# Patient Record
Sex: MALE | Race: BLACK OR AFRICAN AMERICAN | NOT HISPANIC OR LATINO | URBAN - METROPOLITAN AREA
[De-identification: names, ages, dates, MRNs, and addresses within clinical notes are randomized per-mention and may not be internally consistent; named-entity substitution may affect disease eponyms.]

---

## 2018-07-28 ENCOUNTER — INPATIENT (INPATIENT)
Facility: HOSPITAL | Age: 78
LOS: 5 days | Discharge: NURSING, REHAB OR LONG-TERM CARE | End: 2018-08-03
Attending: INTERNAL MEDICINE | Admitting: INTERNAL MEDICINE
Payer: MEDICARE

## 2018-07-28 VITALS
HEIGHT: 64 IN | DIASTOLIC BLOOD PRESSURE: 94 MMHG | HEART RATE: 113 BPM | RESPIRATION RATE: 18 BRPM | SYSTOLIC BLOOD PRESSURE: 124 MMHG | OXYGEN SATURATION: 98 % | WEIGHT: 149.91 LBS | TEMPERATURE: 99 F

## 2018-07-28 DIAGNOSIS — F10.230 ALCOHOL DEPENDENCE WITH WITHDRAWAL, UNCOMPLICATED: ICD-10-CM

## 2018-07-28 DIAGNOSIS — I10 ESSENTIAL (PRIMARY) HYPERTENSION: ICD-10-CM

## 2018-07-28 DIAGNOSIS — R74.8 ABNORMAL LEVELS OF OTHER SERUM ENZYMES: ICD-10-CM

## 2018-07-28 LAB
ALBUMIN SERPL ELPH-MCNC: 2.9 G/DL — LOW (ref 3.3–5)
ALBUMIN SERPL ELPH-MCNC: 3.6 G/DL — SIGNIFICANT CHANGE UP (ref 3.3–5)
ALP SERPL-CCNC: 110 U/L — SIGNIFICANT CHANGE UP (ref 40–120)
ALP SERPL-CCNC: 90 U/L — SIGNIFICANT CHANGE UP (ref 40–120)
ALT FLD-CCNC: 161 U/L — HIGH (ref 12–78)
ALT FLD-CCNC: 210 U/L — HIGH (ref 12–78)
ANION GAP SERPL CALC-SCNC: 12 MMOL/L — SIGNIFICANT CHANGE UP (ref 5–17)
ANION GAP SERPL CALC-SCNC: 13 MMOL/L — SIGNIFICANT CHANGE UP (ref 5–17)
ANION GAP SERPL CALC-SCNC: 9 MMOL/L — SIGNIFICANT CHANGE UP (ref 5–17)
APPEARANCE UR: CLEAR — SIGNIFICANT CHANGE UP
APTT BLD: 24.9 SEC — LOW (ref 27.5–37.4)
AST SERPL-CCNC: 454 U/L — HIGH (ref 15–37)
AST SERPL-CCNC: 643 U/L — HIGH (ref 15–37)
BACTERIA # UR AUTO: ABNORMAL
BASOPHILS # BLD AUTO: 0.01 K/UL — SIGNIFICANT CHANGE UP (ref 0–0.2)
BASOPHILS NFR BLD AUTO: 0.2 % — SIGNIFICANT CHANGE UP (ref 0–2)
BILIRUB DIRECT SERPL-MCNC: 0.47 MG/DL — HIGH (ref 0.05–0.2)
BILIRUB INDIRECT FLD-MCNC: 0.9 MG/DL — SIGNIFICANT CHANGE UP (ref 0.2–1)
BILIRUB SERPL-MCNC: 1.4 MG/DL — HIGH (ref 0.2–1.2)
BILIRUB SERPL-MCNC: 1.9 MG/DL — HIGH (ref 0.2–1.2)
BILIRUB UR-MCNC: NEGATIVE — SIGNIFICANT CHANGE UP
BUN SERPL-MCNC: 15 MG/DL — SIGNIFICANT CHANGE UP (ref 7–23)
BUN SERPL-MCNC: 16 MG/DL — SIGNIFICANT CHANGE UP (ref 7–23)
BUN SERPL-MCNC: 18 MG/DL — SIGNIFICANT CHANGE UP (ref 7–23)
CALCIUM SERPL-MCNC: 6.7 MG/DL — LOW (ref 8.5–10.1)
CALCIUM SERPL-MCNC: 6.8 MG/DL — LOW (ref 8.5–10.1)
CALCIUM SERPL-MCNC: 7.7 MG/DL — LOW (ref 8.5–10.1)
CHLORIDE SERPL-SCNC: 107 MMOL/L — SIGNIFICANT CHANGE UP (ref 96–108)
CHLORIDE SERPL-SCNC: 107 MMOL/L — SIGNIFICANT CHANGE UP (ref 96–108)
CHLORIDE SERPL-SCNC: 97 MMOL/L — SIGNIFICANT CHANGE UP (ref 96–108)
CK MB CFR SERPL CALC: 3 NG/ML — SIGNIFICANT CHANGE UP (ref 0.5–3.6)
CK SERPL-CCNC: 1417 U/L — HIGH (ref 26–308)
CK SERPL-CCNC: 1652 U/L — HIGH (ref 26–308)
CK SERPL-CCNC: 2032 U/L — HIGH (ref 26–308)
CO2 SERPL-SCNC: 24 MMOL/L — SIGNIFICANT CHANGE UP (ref 22–31)
CO2 SERPL-SCNC: 25 MMOL/L — SIGNIFICANT CHANGE UP (ref 22–31)
CO2 SERPL-SCNC: 25 MMOL/L — SIGNIFICANT CHANGE UP (ref 22–31)
COLOR SPEC: YELLOW — SIGNIFICANT CHANGE UP
CREAT SERPL-MCNC: 1.41 MG/DL — HIGH (ref 0.5–1.3)
CREAT SERPL-MCNC: 1.44 MG/DL — HIGH (ref 0.5–1.3)
CREAT SERPL-MCNC: 1.75 MG/DL — HIGH (ref 0.5–1.3)
DIFF PNL FLD: ABNORMAL
EOSINOPHIL # BLD AUTO: 0.02 K/UL — SIGNIFICANT CHANGE UP (ref 0–0.5)
EOSINOPHIL NFR BLD AUTO: 0.4 % — SIGNIFICANT CHANGE UP (ref 0–6)
EPI CELLS # UR: SIGNIFICANT CHANGE UP
ETHANOL SERPL-MCNC: <10 MG/DL — SIGNIFICANT CHANGE UP (ref 0–10)
GLUCOSE SERPL-MCNC: 116 MG/DL — HIGH (ref 70–99)
GLUCOSE SERPL-MCNC: 122 MG/DL — HIGH (ref 70–99)
GLUCOSE SERPL-MCNC: 197 MG/DL — HIGH (ref 70–99)
GLUCOSE UR QL: NEGATIVE MG/DL — SIGNIFICANT CHANGE UP
HCT VFR BLD CALC: 36.6 % — LOW (ref 39–50)
HGB BLD-MCNC: 12.1 G/DL — LOW (ref 13–17)
IMM GRANULOCYTES NFR BLD AUTO: 0.4 % — SIGNIFICANT CHANGE UP (ref 0–1.5)
INR BLD: 1.09 RATIO — SIGNIFICANT CHANGE UP (ref 0.88–1.16)
KETONES UR-MCNC: NEGATIVE — SIGNIFICANT CHANGE UP
LEUKOCYTE ESTERASE UR-ACNC: NEGATIVE — SIGNIFICANT CHANGE UP
LYMPHOCYTES # BLD AUTO: 0.61 K/UL — LOW (ref 1–3.3)
LYMPHOCYTES # BLD AUTO: 10.9 % — LOW (ref 13–44)
MAGNESIUM SERPL-MCNC: 1.7 MG/DL — SIGNIFICANT CHANGE UP (ref 1.6–2.6)
MAGNESIUM SERPL-MCNC: 1.8 MG/DL — SIGNIFICANT CHANGE UP (ref 1.6–2.6)
MAGNESIUM SERPL-MCNC: 1.8 MG/DL — SIGNIFICANT CHANGE UP (ref 1.6–2.6)
MCHC RBC-ENTMCNC: 27.4 PG — SIGNIFICANT CHANGE UP (ref 27–34)
MCHC RBC-ENTMCNC: 33.1 GM/DL — SIGNIFICANT CHANGE UP (ref 32–36)
MCV RBC AUTO: 82.8 FL — SIGNIFICANT CHANGE UP (ref 80–100)
MONOCYTES # BLD AUTO: 0.45 K/UL — SIGNIFICANT CHANGE UP (ref 0–0.9)
MONOCYTES NFR BLD AUTO: 8.1 % — SIGNIFICANT CHANGE UP (ref 2–14)
NEUTROPHILS # BLD AUTO: 4.48 K/UL — SIGNIFICANT CHANGE UP (ref 1.8–7.4)
NEUTROPHILS NFR BLD AUTO: 80 % — HIGH (ref 43–77)
NITRITE UR-MCNC: NEGATIVE — SIGNIFICANT CHANGE UP
NRBC # BLD: 0 /100 WBCS — SIGNIFICANT CHANGE UP (ref 0–0)
PH UR: 6 — SIGNIFICANT CHANGE UP (ref 5–8)
PHOSPHATE SERPL-MCNC: 2 MG/DL — LOW (ref 2.5–4.5)
PHOSPHATE SERPL-MCNC: 2.3 MG/DL — LOW (ref 2.5–4.5)
PHOSPHATE SERPL-MCNC: 2.5 MG/DL — SIGNIFICANT CHANGE UP (ref 2.5–4.5)
PLATELET # BLD AUTO: 121 K/UL — LOW (ref 150–400)
POTASSIUM SERPL-MCNC: 3.6 MMOL/L — SIGNIFICANT CHANGE UP (ref 3.5–5.3)
POTASSIUM SERPL-MCNC: 3.7 MMOL/L — SIGNIFICANT CHANGE UP (ref 3.5–5.3)
POTASSIUM SERPL-MCNC: 4.1 MMOL/L — SIGNIFICANT CHANGE UP (ref 3.5–5.3)
POTASSIUM SERPL-SCNC: 3.6 MMOL/L — SIGNIFICANT CHANGE UP (ref 3.5–5.3)
POTASSIUM SERPL-SCNC: 3.7 MMOL/L — SIGNIFICANT CHANGE UP (ref 3.5–5.3)
POTASSIUM SERPL-SCNC: 4.1 MMOL/L — SIGNIFICANT CHANGE UP (ref 3.5–5.3)
PROT SERPL-MCNC: 5.9 GM/DL — LOW (ref 6–8.3)
PROT SERPL-MCNC: 7.5 GM/DL — SIGNIFICANT CHANGE UP (ref 6–8.3)
PROT UR-MCNC: 30 MG/DL
PROTHROM AB SERPL-ACNC: 11.9 SEC — SIGNIFICANT CHANGE UP (ref 9.8–12.7)
RBC # BLD: 4.42 M/UL — SIGNIFICANT CHANGE UP (ref 4.2–5.8)
RBC # FLD: 16.4 % — HIGH (ref 10.3–14.5)
RBC CASTS # UR COMP ASSIST: ABNORMAL /HPF (ref 0–4)
SODIUM SERPL-SCNC: 135 MMOL/L — SIGNIFICANT CHANGE UP (ref 135–145)
SODIUM SERPL-SCNC: 141 MMOL/L — SIGNIFICANT CHANGE UP (ref 135–145)
SODIUM SERPL-SCNC: 143 MMOL/L — SIGNIFICANT CHANGE UP (ref 135–145)
SP GR SPEC: 1.01 — SIGNIFICANT CHANGE UP (ref 1.01–1.02)
T3 SERPL-MCNC: 70 NG/DL — LOW (ref 80–200)
T4 AB SER-ACNC: 5 UG/DL — SIGNIFICANT CHANGE UP (ref 4.6–12)
TROPONIN I SERPL-MCNC: 0.1 NG/ML — HIGH (ref 0.01–0.04)
TROPONIN I SERPL-MCNC: 0.17 NG/ML — HIGH (ref 0.01–0.04)
TROPONIN I SERPL-MCNC: 0.23 NG/ML — HIGH (ref 0.01–0.04)
TROPONIN I SERPL-MCNC: 0.26 NG/ML — HIGH (ref 0.01–0.04)
TSH SERPL-MCNC: 1.04 UU/ML — SIGNIFICANT CHANGE UP (ref 0.36–3.74)
UROBILINOGEN FLD QL: NEGATIVE MG/DL — SIGNIFICANT CHANGE UP
VIT B12 SERPL-MCNC: 1101 PG/ML — SIGNIFICANT CHANGE UP (ref 232–1245)
WBC # BLD: 5.59 K/UL — SIGNIFICANT CHANGE UP (ref 3.8–10.5)
WBC # FLD AUTO: 5.59 K/UL — SIGNIFICANT CHANGE UP (ref 3.8–10.5)
WBC UR QL: SIGNIFICANT CHANGE UP

## 2018-07-28 PROCEDURE — 72125 CT NECK SPINE W/O DYE: CPT | Mod: 26

## 2018-07-28 PROCEDURE — 76377 3D RENDER W/INTRP POSTPROCES: CPT | Mod: 26

## 2018-07-28 PROCEDURE — 71045 X-RAY EXAM CHEST 1 VIEW: CPT | Mod: 26

## 2018-07-28 PROCEDURE — 99223 1ST HOSP IP/OBS HIGH 75: CPT

## 2018-07-28 PROCEDURE — 99291 CRITICAL CARE FIRST HOUR: CPT

## 2018-07-28 PROCEDURE — 70450 CT HEAD/BRAIN W/O DYE: CPT | Mod: 26

## 2018-07-28 PROCEDURE — 93010 ELECTROCARDIOGRAM REPORT: CPT

## 2018-07-28 RX ORDER — SODIUM CHLORIDE 9 MG/ML
1000 INJECTION, SOLUTION INTRAVENOUS
Qty: 0 | Refills: 0 | Status: DISCONTINUED | OUTPATIENT
Start: 2018-07-28 | End: 2018-08-01

## 2018-07-28 RX ORDER — ASPIRIN/CALCIUM CARB/MAGNESIUM 324 MG
325 TABLET ORAL ONCE
Qty: 0 | Refills: 0 | Status: COMPLETED | OUTPATIENT
Start: 2018-07-28 | End: 2018-07-28

## 2018-07-28 RX ORDER — METOPROLOL TARTRATE 50 MG
12.5 TABLET ORAL
Qty: 0 | Refills: 0 | Status: DISCONTINUED | OUTPATIENT
Start: 2018-07-28 | End: 2018-08-03

## 2018-07-28 RX ORDER — SODIUM CHLORIDE 9 MG/ML
1000 INJECTION, SOLUTION INTRAVENOUS
Qty: 0 | Refills: 0 | Status: COMPLETED | OUTPATIENT
Start: 2018-07-28 | End: 2018-07-28

## 2018-07-28 RX ORDER — SODIUM CHLORIDE 9 MG/ML
1000 INJECTION INTRAMUSCULAR; INTRAVENOUS; SUBCUTANEOUS ONCE
Qty: 0 | Refills: 0 | Status: COMPLETED | OUTPATIENT
Start: 2018-07-28 | End: 2018-07-28

## 2018-07-28 RX ORDER — AMLODIPINE BESYLATE 2.5 MG/1
5 TABLET ORAL DAILY
Qty: 0 | Refills: 0 | Status: DISCONTINUED | OUTPATIENT
Start: 2018-07-28 | End: 2018-08-03

## 2018-07-28 RX ORDER — ONDANSETRON 8 MG/1
4 TABLET, FILM COATED ORAL ONCE
Qty: 0 | Refills: 0 | Status: COMPLETED | OUTPATIENT
Start: 2018-07-28 | End: 2018-07-28

## 2018-07-28 RX ORDER — THIAMINE MONONITRATE (VIT B1) 100 MG
100 TABLET ORAL DAILY
Qty: 0 | Refills: 0 | Status: DISCONTINUED | OUTPATIENT
Start: 2018-07-28 | End: 2018-08-03

## 2018-07-28 RX ORDER — ASPIRIN/CALCIUM CARB/MAGNESIUM 324 MG
81 TABLET ORAL DAILY
Qty: 0 | Refills: 0 | Status: DISCONTINUED | OUTPATIENT
Start: 2018-07-28 | End: 2018-08-03

## 2018-07-28 RX ADMIN — Medication 325 MILLIGRAM(S): at 09:42

## 2018-07-28 RX ADMIN — Medication 2 MILLIGRAM(S): at 15:35

## 2018-07-28 RX ADMIN — SODIUM CHLORIDE 1000 MILLILITER(S): 9 INJECTION INTRAMUSCULAR; INTRAVENOUS; SUBCUTANEOUS at 07:33

## 2018-07-28 RX ADMIN — SODIUM CHLORIDE 1000 MILLILITER(S): 9 INJECTION INTRAMUSCULAR; INTRAVENOUS; SUBCUTANEOUS at 08:31

## 2018-07-28 RX ADMIN — Medication 1 MILLIGRAM(S): at 13:04

## 2018-07-28 RX ADMIN — SODIUM CHLORIDE 75 MILLILITER(S): 9 INJECTION, SOLUTION INTRAVENOUS at 11:19

## 2018-07-28 RX ADMIN — Medication 12.5 MILLIGRAM(S): at 19:48

## 2018-07-28 RX ADMIN — SODIUM CHLORIDE 1000 MILLILITER(S): 9 INJECTION, SOLUTION INTRAVENOUS at 11:18

## 2018-07-28 RX ADMIN — ONDANSETRON 4 MILLIGRAM(S): 8 TABLET, FILM COATED ORAL at 08:58

## 2018-07-28 RX ADMIN — SODIUM CHLORIDE 500 MILLILITER(S): 9 INJECTION, SOLUTION INTRAVENOUS at 08:13

## 2018-07-28 RX ADMIN — Medication 2 MILLIGRAM(S): at 19:47

## 2018-07-28 NOTE — CONSULT NOTE ADULT - ASSESSMENT
78m hx of HTN and etoh abuse (per niece) admitted after fall vs syncope.  Patient was in a different room when niece heard a thud. She found him on the ground face down. Patient did not recall falling. He denies chest pain/palpitations/dyspnea.  Per niece, his memory has slowly been deteriorating.  Denied current EtOH abuse; last drink 1-2 days ago... drank 3-4 drinks/day prior.  EtOH level negative.  CE mildly positive... trop 0.1 -> 0.2.  CK 1400.  Admitted with EtoH withdrawl/confusion/tremors and RIVER with elevated LFTs.  ECG: NSR 89bpm; no ischemic changes    -monitor on tele  -trend Jovanni  -2D echo  -cont IVFs  -B12 / folate / thiamine  -replete lytes  -CIWA as needed; very tremulous; getting ativan now  -+cardiac markers likely 2/2 withdrawl and fall and NOT ACS; pt completely asymptomatic and EKG normal.  Can f/u as outpt for stress test when stable.  -start daily asa for now  -start bbs as BP/HR tolerate

## 2018-07-28 NOTE — ED PROVIDER NOTE - MEDICAL DECISION MAKING DETAILS
patient pw acute alcohol withdrawal. his memory impairment suggest korsakoffs dementia. which suggest he has been a longstanding drinker. troponin elevated, but patient does not have anginal equivalent at this time. will start on aspirin and have cardiology evaluate. patient pw acute alcohol withdrawal. his memory impairment suggest korsakoffs dementia. which suggest he has been a longstanding drinker. troponin elevated, but patient does not have anginal equivalent at this time. will start on aspirin and have cardiology evaluate. I read ekg as sinus with first deg av block, PVCs, rate 89, no st elevation or depression, no qtc or pr prolongation, no t inversions or axis deviation.

## 2018-07-28 NOTE — ED ADULT TRIAGE NOTE - CHIEF COMPLAINT QUOTE
Patient fell on floor, doesn't recall what happened. Unknown if patient hit head or is on blood thinners. Face symmetry and no slurred speech on triage.

## 2018-07-28 NOTE — H&P ADULT - ASSESSMENT
patient  evaluated  orders  written discussed with  pt's  family   and  ER  MD  , will  write  full  note  later

## 2018-07-28 NOTE — CONSULT NOTE ADULT - SUBJECTIVE AND OBJECTIVE BOX
CARDIOLOGY CONSULT NOTE    Patient is a 78y Male with a known history of :    HPI:  78m hx of HTN and etoh abuse (per niece) admitted after fall vs syncope.  Patient was in a different room when niece heard a thud. She found him on the ground face down. Patient did not recall falling. He denies chest pain/palpitations/dyspnea.  Per niece, his memory has slowly been deteriorating.  Denied current EtOH abuse; last drink 1-2 days ago... drank 3-4 drinks/day prior.  EtOH level negative.  CE mildly positive... trop 0.1 -> 0.2.  CK 1400.  Admitted with EtoH withdrawl/confusion/tremors and RIVER with elevated LFTs.    REVIEW OF SYSTEMS:    CONSTITUTIONAL: confused; +tremors  EYES: No eye pain, visual disturbances, or discharge  ENMT:  No difficulty hearing, tinnitus, vertigo; No sinus or throat pain  NECK: No pain or stiffness  BREASTS: No pain, masses, or nipple discharge  RESPIRATORY: No cough, wheezing, chills or hemoptysis; No shortness of breath  CARDIOVASCULAR: No chest pain, palpitations, dizziness, or leg swelling  GASTROINTESTINAL: No abdominal or epigastric pain. No nausea, vomiting, or hematemesis; No diarrhea or constipation. No melena or hematochezia.  GENITOURINARY: No dysuria, frequency, hematuria, or incontinence  NEUROLOGICAL: No headaches, memory loss, loss of strength, numbness, or tremors  SKIN: No itching, burning, rashes, or lesions   LYMPH NODES: No enlarged glands  ENDOCRINE: No heat or cold intolerance; No hair loss  MUSCULOSKELETAL: No joint pain or swelling; No muscle, back, or extremity pain  PSYCHIATRIC: No depression, anxiety, mood swings, or difficulty sleeping  HEME/LYMPH: No easy bruising, or bleeding gums  ALLERGY AND IMMUNOLOGIC: No hives or eczema    MEDICATIONS  (STANDING):  amLODIPine   Tablet 5 milliGRAM(s) Oral daily  dextrose 5% + sodium chloride 0.45%. 1000 milliLiter(s) (75 mL/Hr) IV Continuous <Continuous>  thiamine 100 milliGRAM(s) Oral daily    MEDICATIONS  (PRN):  LORazepam     Tablet 1 milliGRAM(s) Oral every 2 hours PRN CIWA-Ar score increase by 2 points and a total score of 7 or less      ALLERGIES: No Known Drug Allergies  shellfish (Rash)      FAMILY HISTORY:      PHYSICAL EXAMINATION:  -----------------------------  T(C): 37.1 (07-28-18 @ 11:23), Max: 37.3 (07-28-18 @ 07:03)  HR: 98 (07-28-18 @ 11:23) (98 - 113)  BP: 158/91 (07-28-18 @ 11:23) (124/94 - 158/91)  RR: 18 (07-28-18 @ 11:23) (18 - 18)  SpO2: 99% (07-28-18 @ 11:23) (98% - 99%)  Wt(kg): --    Height (cm): 162.56 (07-28 @ 07:03)  Weight (kg): 68 (07-28 @ 07:03)  BMI (kg/m2): 25.7 (07-28 @ 07:03)  BSA (m2): 1.73 (07-28 @ 07:03)    Constitutional: ill appearing but in no acute distress.   Eyes: the conjunctiva exhibited no abnormalities and the eyelids demonstrated no xanthelasmas.   HEENT: normal oral mucosa, no oral pallor and no oral cyanosis.   Neck: normal jugular venous A waves present, normal jugular venous V waves present and no jugular venous cali A waves.   Pulmonary: no respiratory distress, normal respiratory rhythm and effort, no accessory muscle use and lungs were clear to auscultation bilaterally.   Cardiovascular: heart rate and rhythm were normal, normal S1 and S2 and no murmur, gallop, rub, heave or thrill are present.   Abdomen: soft, non-tender, no hepato-splenomegaly and no abdominal mass palpated.   Musculoskeletal: the gait could not be assessed..   Extremities: no clubbing of the fingernails, no localized cyanosis, no petechial hemorrhages and no ischemic changes.   Skin: normal skin color and pigmentation, no rash, no venous stasis, no skin lesions, no skin ulcer and no xanthoma was observed.   Psychiatric: oriented to person, place, and time; +tremors    LABS:   --------  07-28    141  |  107  |  16  ----------------------------<  116<H>  3.7   |  25  |  1.44<H>    Ca    6.7<L>      28 Jul 2018 10:34  Phos  2.0     07-28  Mg     1.7     07-28    TPro  5.9<L>  /  Alb  2.9<L>  /  TBili  1.4<H>  /  DBili  .47<H>  /  AST  454<H>  /  ALT  161<H>  /  AlkPhos  90  07-28                         12.1   5.59  )-----------( 121      ( 28 Jul 2018 07:34 )             36.6     PT/INR - ( 28 Jul 2018 07:34 )   PT: 11.9 sec;   INR: 1.09 ratio         PTT - ( 28 Jul 2018 07:34 )  PTT:24.9 sec    07-28 @ 10:34 CPK total:--, CKMB --, Troponin I - .230 ng/mL<H>  07-28 @ 07:34 CPK total:--, CKMB --, Troponin I - .101 ng/mL<H>          RADIOLOGY:  -----------------    ECG: NSR 89bpm; no ischemic changes

## 2018-07-28 NOTE — H&P ADULT - NSHPPHYSICALEXAM_GEN_ALL_CORE
PHYSICAL EXAM:    GENERAL: NAD, well-groomed, well-developed  HEAD:  Atraumatic, Normocephalic  EYES: EOMI, PERRLA, conjunctiva and sclera clear  ENMT: No tonsillar erythema, exudates, or enlargement; Moist mucous membranes, , No lesions  NECK: Supple, No JVD, Normal thyroid  NERVOUS SYSTEM:  Alert  confused no  focal  deficits  CHEST/LUNG: Clear  bilaterally; No rales, rhonchi, wheezing, or rubs  HEART: Regular rate and rhythm; No murmurs, rubs, or gallops  ABDOMEN: Soft, Nontender, Nondistended; no  masses Bowel sounds present  EXTREMITIES:  + Peripheral Pulses, No clubbing, cyanosis, or edema  LYMPH: No lymphadenopathy noted   RECTAL: deferred  SKIN: No rashes or lesions

## 2018-07-28 NOTE — ED PROVIDER NOTE - OBJECTIVE STATEMENT
Pertinent PMH/PSH/FHx/SHx and Review of Systems contained within:  78m hx of htn and etoh abuse (per niece) pw fall vs syncope. patient was in a different room when niece heard a thud. she found him on the ground face down. patient did not recall falling. he denies ha, cp, vision change, dizziness, nausea, vomiting, abd pain, rash, bleeding. he endorses being tremulous, which he attributes to being cold. he denies drinking at all. per niece, his memory has slowly been deteriorating  Fh and Sh not otherwise contributory  ROS otherwise negative

## 2018-07-28 NOTE — ED PROVIDER NOTE - CARE PLAN
Principal Discharge DX:	Alcohol withdrawal syndrome without complication  Secondary Diagnosis:	Elevated troponin I level

## 2018-07-28 NOTE — ED ADULT NURSE NOTE - OBJECTIVE STATEMENT
Received pt from triage sitting on stretcher with niece Jessi, per niece pt fell this morning in the bathroom found on his left side pt has been staying with her for since last night. she reports that pt has history of drinking alcohol last drink about 2 days ago but pt denies stated his last drink was a while ago. also report recent incontinence and memory lost where pt keep asking the same question repeatedly

## 2018-07-28 NOTE — H&P ADULT - HISTORY OF PRESENT ILLNESS
patient  brought  to  er  because  patient  reported  found  on  floor  worsening  confusion  tremoulseness  etoh  abuse

## 2018-07-28 NOTE — ED PROVIDER NOTE - PHYSICAL EXAMINATION
Gen: Alert, NAD  Head: NC, AT   Eyes: PERRL, EOMI, normal lids/conjunctiva  ENT: normal hearing, patent oropharynx without erythema/exudate, uvula midline  Neck: supple, no tenderness, Trachea midline  Pulm: Bilateral BS, normal resp effort, no wheeze/stridor/retractions  CV: RRR, no M/R/G, 2+ radial and dp pulses bl, no edema  Abd: soft, NT/ND, +BS, no hepatosplenomegaly  Mskel: extremities x4 with normal ROM and no joint effusions. no ctl spine ttp.   Skin: no rash, no bruising   Neuro: AAOx1, no sensory deficits, CN 2-12 intact. tremulousness of the upper extremities at rest. impaired short term memory. unsteady gait

## 2018-07-29 LAB
ALBUMIN SERPL ELPH-MCNC: 2.7 G/DL — LOW (ref 3.3–5)
ALP SERPL-CCNC: 94 U/L — SIGNIFICANT CHANGE UP (ref 40–120)
ALT FLD-CCNC: 149 U/L — HIGH (ref 12–78)
ANION GAP SERPL CALC-SCNC: 11 MMOL/L — SIGNIFICANT CHANGE UP (ref 5–17)
AST SERPL-CCNC: 300 U/L — HIGH (ref 15–37)
BILIRUB DIRECT SERPL-MCNC: 0.38 MG/DL — HIGH (ref 0.05–0.2)
BILIRUB INDIRECT FLD-MCNC: 1.1 MG/DL — HIGH (ref 0.2–1)
BILIRUB SERPL-MCNC: 1.5 MG/DL — HIGH (ref 0.2–1.2)
BUN SERPL-MCNC: 9 MG/DL — SIGNIFICANT CHANGE UP (ref 7–23)
CALCIUM SERPL-MCNC: 6.9 MG/DL — LOW (ref 8.5–10.1)
CHLORIDE SERPL-SCNC: 110 MMOL/L — HIGH (ref 96–108)
CK SERPL-CCNC: 1951 U/L — HIGH (ref 26–308)
CO2 SERPL-SCNC: 24 MMOL/L — SIGNIFICANT CHANGE UP (ref 22–31)
CREAT SERPL-MCNC: 1.27 MG/DL — SIGNIFICANT CHANGE UP (ref 0.5–1.3)
GLUCOSE SERPL-MCNC: 128 MG/DL — HIGH (ref 70–99)
MAGNESIUM SERPL-MCNC: 1.8 MG/DL — SIGNIFICANT CHANGE UP (ref 1.6–2.6)
PHOSPHATE SERPL-MCNC: 2.4 MG/DL — LOW (ref 2.5–4.5)
POTASSIUM SERPL-MCNC: 3.6 MMOL/L — SIGNIFICANT CHANGE UP (ref 3.5–5.3)
POTASSIUM SERPL-SCNC: 3.6 MMOL/L — SIGNIFICANT CHANGE UP (ref 3.5–5.3)
PROT SERPL-MCNC: 5.9 GM/DL — LOW (ref 6–8.3)
SODIUM SERPL-SCNC: 145 MMOL/L — SIGNIFICANT CHANGE UP (ref 135–145)
TROPONIN I SERPL-MCNC: 0.11 NG/ML — HIGH (ref 0.01–0.04)

## 2018-07-29 PROCEDURE — 93306 TTE W/DOPPLER COMPLETE: CPT | Mod: 26

## 2018-07-29 PROCEDURE — 99232 SBSQ HOSP IP/OBS MODERATE 35: CPT

## 2018-07-29 RX ADMIN — Medication 2 MILLIGRAM(S): at 10:38

## 2018-07-29 RX ADMIN — Medication 1.5 MILLIGRAM(S): at 21:43

## 2018-07-29 RX ADMIN — Medication 12.5 MILLIGRAM(S): at 17:40

## 2018-07-29 RX ADMIN — Medication 1.5 MILLIGRAM(S): at 13:25

## 2018-07-29 RX ADMIN — Medication 81 MILLIGRAM(S): at 11:23

## 2018-07-29 RX ADMIN — Medication 12.5 MILLIGRAM(S): at 05:34

## 2018-07-29 RX ADMIN — Medication 100 MILLIGRAM(S): at 11:23

## 2018-07-29 RX ADMIN — Medication 1 MILLIGRAM(S): at 08:53

## 2018-07-29 RX ADMIN — Medication 2 MILLIGRAM(S): at 00:06

## 2018-07-29 RX ADMIN — Medication 2 MILLIGRAM(S): at 05:35

## 2018-07-29 RX ADMIN — Medication 1 TABLET(S): at 11:23

## 2018-07-29 RX ADMIN — AMLODIPINE BESYLATE 5 MILLIGRAM(S): 2.5 TABLET ORAL at 05:34

## 2018-07-29 RX ADMIN — Medication 1.5 MILLIGRAM(S): at 17:39

## 2018-07-29 NOTE — PROGRESS NOTE ADULT - ASSESSMENT
78m hx of HTN and etoh abuse (per niece) admitted after fall vs syncope.  Patient was in a different room when niece heard a thud. She found him on the ground face down. Patient did not recall falling. He denies chest pain/palpitations/dyspnea.  Per niece, his memory has slowly been deteriorating.  Denied current EtOH abuse; last drink 1-2 days ago... drank 3-4 drinks/day prior.  EtOH level negative.  CE mildly positive... trop 0.1 -> 0.2 -> 0.1.  CK 1400.  Admitted with EtoH withdrawl/confusion/tremors and RIVER with elevated LFTs.  ECG: NSR 89bpm; no ischemic changes  Tele: HRs 80s  Remains agitated and on ativan.    -monitor on tele  -Jovanni trended down  -2D echo pending  -cont IVFs  -B12 / folate / thiamine  -replete lytes  -CIWA as needed; very tremulous; getting ativan now  -+cardiac markers likely 2/2 withdrawl and fall and NOT ACS; pt completely asymptomatic and EKG normal.  Can f/u as outpt for stress test when stable.  -on daily asa   -BP/HR tolerating low-dose bbs 78m hx of HTN and etoh abuse (per niece) admitted after fall vs syncope.  Patient was in a different room when niece heard a thud. She found him on the ground face down. Patient did not recall falling. He denies chest pain/palpitations/dyspnea.  Per niece, his memory has slowly been deteriorating.  Denied current EtOH abuse; last drink 1-2 days ago... drank 3-4 drinks/day prior.  EtOH level negative.  CE mildly positive... trop 0.1 -> 0.2 -> 0.1.  CK 1400.  Admitted with EtoH withdrawl/confusion/tremors and RIVER with elevated LFTs.  ECG: NSR 89bpm; no ischemic changes  Tele: HRs 80s  Remains agitated and on ativan.    -monitor on tele  -Jovanni trended down  -2D echo normal as above  -cont IVFs  -B12 / folate / thiamine  -replete lytes  -CIWA as needed; very tremulous; getting ativan now  -+cardiac markers likely 2/2 withdrawl and fall and NOT ACS; pt completely asymptomatic; EKG and echo normal.  Can f/u as outpt for stress test when stable.  -on daily asa   -BP/HR tolerating low-dose bbs   -will sign off for now; please call back with any further questions

## 2018-07-29 NOTE — PROGRESS NOTE ADULT - SUBJECTIVE AND OBJECTIVE BOX
INTERVAL HPI/OVERNIGHT EVENTS:    reportes  with  agitation ativan  changed  to  IV    REVIEW OF SYSTEMS:  CONSTITUTIONAL:  comfortable  no  complaints      MEDICATION:  amLODIPine   Tablet 5 milliGRAM(s) Oral daily  aspirin enteric coated 81 milliGRAM(s) Oral daily  dextrose 5% + sodium chloride 0.45%. 1000 milliLiter(s) IV Continuous <Continuous>  LORazepam     Tablet 1 milliGRAM(s) Oral every 2 hours PRN  LORazepam   Injectable 2 milliGRAM(s) IV Push every 4 hours  LORazepam   Injectable 1.5 milliGRAM(s) IV Push every 4 hours  LORazepam   Injectable 1 milliGRAM(s) IV Push every 2 hours PRN  LORazepam   Injectable   IV Push   metoprolol tartrate 12.5 milliGRAM(s) Oral two times a day  multivitamin 1 Tablet(s) Oral daily  thiamine 100 milliGRAM(s) Oral daily    Vital Signs Last 24 Hrs  T(C): 36.4 (2018 05:31), Max: 37.1 (2018 11:23)  T(F): 97.6 (2018 05:31), Max: 98.7 (2018 11:23)  HR: 70 (2018 05:31) (65 - 98)  BP: 168/80 (2018 05:31) (142/76 - 168/80)  BP(mean): --  RR: 18 (2018 05:31) (17 - 18)  SpO2: 98% (2018 05:31) (98% - 100%)    PHYSICAL EXAM:  GENERAL: NAD, well-groomed, well-developed  EYES:  conjunctiva and sclera clear  ENMT:  Moist mucous membranes,   NECK: Supple, No JVD, Normal thyroid  NERVOUS SYSTEM:  Alert oriented   no  focal  deficits;   CHEST/LUNG: Clear    HEART: Regular rate and rhythm; No murmurs, rubs, or gallops  ABDOMEN: Soft, Nontender, Nondistended; Bowel sounds present  EXTREMITIES:  no  edema no  tenderness  SKIN: No rashes   LABS:                        12.1   5.59  )-----------( 121      ( 2018 07:34 )             36.6     07-    145  |  110<H>  |  9   ----------------------------<  128<H>  3.6   |  24  |  1.27    Ca    6.9<L>      2018 04:17  Phos  2.4       Mg     1.8         TPro  5.9<L>  /  Alb  2.7<L>  /  TBili  1.5<H>  /  DBili  .38<H>  /  AST  300<H>  /  ALT  149<H>  /  AlkPhos  94      PT/INR - ( 2018 07:34 )   PT: 11.9 sec;   INR: 1.09 ratio         PTT - ( 2018 07:34 )  PTT:24.9 sec  Urinalysis Basic - ( 2018 09:09 )    Color: Yellow / Appearance: Clear / S.010 / pH: x  Gluc: x / Ketone: Negative  / Bili: Negative / Urobili: Negative mg/dL   Blood: x / Protein: 30 mg/dL / Nitrite: Negative   Leuk Esterase: Negative / RBC: 3-5 /HPF / WBC 0-2   Sq Epi: x / Non Sq Epi: Few / Bacteria: Few      CAPILLARY BLOOD GLUCOSE          RADIOLOGY & ADDITIONAL TESTS:    Imaging reports  Personally Reviewed:  [ x] YES  [ ] NO    Consultant(s) Notes Reviewed:  [x ] YES  [ ] NO    Care Discussed with Consultants/Other Providers [x ] YES  [ ] NO  Assessment and Plan:  		      Problem/Plan - 1:  ·  Problem: Alcohol withdrawal syndrome without complication.  Plan: etoh  withdrawl  protocol  monitor  in  ICU.     Problem/Plan - 2:  ·  Problem: Elevated troponin I level.  Plan: serial troponins  monitor  on  telelmtry  furthe w/u  and  treatment  asper  clinical  course.     Problem/Plan - 3:  ·  Problem: HTN (hypertension).  Plan: diet  norvasc. INTERVAL HPI/OVERNIGHT EVENTS:    reportes  with  agitation ativan  changed  to  IV    REVIEW OF SYSTEMS:  CONSTITUTIONAL:  comfortable  no  complaints      MEDICATION:  amLODIPine   Tablet 5 milliGRAM(s) Oral daily  aspirin enteric coated 81 milliGRAM(s) Oral daily  dextrose 5% + sodium chloride 0.45%. 1000 milliLiter(s) IV Continuous <Continuous>  LORazepam     Tablet 1 milliGRAM(s) Oral every 2 hours PRN  LORazepam   Injectable 2 milliGRAM(s) IV Push every 4 hours  LORazepam   Injectable 1.5 milliGRAM(s) IV Push every 4 hours  LORazepam   Injectable 1 milliGRAM(s) IV Push every 2 hours PRN  LORazepam   Injectable   IV Push   metoprolol tartrate 12.5 milliGRAM(s) Oral two times a day  multivitamin 1 Tablet(s) Oral daily  thiamine 100 milliGRAM(s) Oral daily    Vital Signs Last 24 Hrs  T(C): 36.4 (2018 05:31), Max: 37.1 (2018 11:23)  T(F): 97.6 (2018 05:31), Max: 98.7 (2018 11:23)  HR: 70 (2018 05:31) (65 - 98)  BP: 168/80 (2018 05:31) (142/76 - 168/80)  BP(mean): --  RR: 18 (2018 05:31) (17 - 18)  SpO2: 98% (2018 05:31) (98% - 100%)    PHYSICAL EXAM:  GENERAL: NAD, well-groomed, well-developed  EYES:  conjunctiva and sclera clear  ENMT:  Moist mucous membranes,   NECK: Supple, No JVD, Normal thyroid  NERVOUS SYSTEM:  Alert oriented   no  focal  deficits;   CHEST/LUNG: Clear    HEART: Regular rate and rhythm; No murmurs, rubs, or gallops  ABDOMEN: Soft, Nontender, Nondistended; Bowel sounds present  EXTREMITIES:  no  edema no  tenderness  SKIN: No rashes   LABS:                        12.1   5.59  )-----------( 121      ( 2018 07:34 )             36.6     07-    145  |  110<H>  |  9   ----------------------------<  128<H>  3.6   |  24  |  1.27    Ca    6.9<L>      2018 04:17  Phos  2.4       Mg     1.8         TPro  5.9<L>  /  Alb  2.7<L>  /  TBili  1.5<H>  /  DBili  .38<H>  /  AST  300<H>  /  ALT  149<H>  /  AlkPhos  94      PT/INR - ( 2018 07:34 )   PT: 11.9 sec;   INR: 1.09 ratio         PTT - ( 2018 07:34 )  PTT:24.9 sec  Urinalysis Basic - ( 2018 09:09 )    Color: Yellow / Appearance: Clear / S.010 / pH: x  Gluc: x / Ketone: Negative  / Bili: Negative / Urobili: Negative mg/dL   Blood: x / Protein: 30 mg/dL / Nitrite: Negative   Leuk Esterase: Negative / RBC: 3-5 /HPF / WBC 0-2   Sq Epi: x / Non Sq Epi: Few / Bacteria: Few      CAPILLARY BLOOD GLUCOSE          RADIOLOGY & ADDITIONAL TESTS:    Imaging reports  Personally Reviewed:  [ x] YES  [ ] NO    Consultant(s) Notes Reviewed:  [x ] YES  [ ] NO    Care Discussed with Consultants/Other Providers [x ] YES  [ ] NO  Assessment and Plan:  		      Problem/Plan - 1:  ·  Problem: Alcohol withdrawal syndrome without complication.  Plan: etoh  withdrawl  protocol  monitor on  telemetry    Problem/Plan - 2:  ·  Problem: Elevated troponin I level.  Plan: serial troponins  monitor  on  telelmtry  furthe w/u  and  treatment  asper  clinical  course.     Problem/Plan - 3:  ·  Problem: HTN (hypertension).  Plan: diet  norvasc.

## 2018-07-29 NOTE — PROGRESS NOTE ADULT - SUBJECTIVE AND OBJECTIVE BOX
Patient is a 78y Male with a known history of :    HPI:  78m hx of HTN and etoh abuse (per niece) admitted after fall vs syncope.  Patient was in a different room when niece heard a thud. She found him on the ground face down. Patient did not recall falling. He denies chest pain/palpitations/dyspnea.  Per niece, his memory has slowly been deteriorating.  Denied current EtOH abuse; last drink 1-2 days ago... drank 3-4 drinks/day prior.  EtOH level negative.  CE mildly positive... trop 0.1 -> 0.2.  CK 1400.  Admitted with EtoH withdrawl/confusion/tremors and RIVER with elevated LFTs.    REVIEW OF SYSTEMS:    CONSTITUTIONAL: confused; +tremors  EYES: No eye pain, visual disturbances, or discharge  ENMT:  No difficulty hearing, tinnitus, vertigo; No sinus or throat pain  NECK: No pain or stiffness  BREASTS: No pain, masses, or nipple discharge  RESPIRATORY: No cough, wheezing, chills or hemoptysis; No shortness of breath  CARDIOVASCULAR: No chest pain, palpitations, dizziness, or leg swelling  GASTROINTESTINAL: No abdominal or epigastric pain. No nausea, vomiting, or hematemesis; No diarrhea or constipation. No melena or hematochezia.  GENITOURINARY: No dysuria, frequency, hematuria, or incontinence  NEUROLOGICAL: No headaches, memory loss, loss of strength, numbness, or tremors  SKIN: No itching, burning, rashes, or lesions   LYMPH NODES: No enlarged glands  ENDOCRINE: No heat or cold intolerance; No hair loss  MUSCULOSKELETAL: No joint pain or swelling; No muscle, back, or extremity pain  PSYCHIATRIC: No depression, anxiety, mood swings, or difficulty sleeping  HEME/LYMPH: No easy bruising, or bleeding gums  ALLERGY AND IMMUNOLOGIC: No hives or eczema    MEDICATIONS  (STANDING):  amLODIPine   Tablet 5 milliGRAM(s) Oral daily  aspirin enteric coated 81 milliGRAM(s) Oral daily  dextrose 5% + sodium chloride 0.45%. 1000 milliLiter(s) (75 mL/Hr) IV Continuous <Continuous>  LORazepam   Injectable 1.5 milliGRAM(s) IV Push every 4 hours  LORazepam   Injectable   IV Push   metoprolol tartrate 12.5 milliGRAM(s) Oral two times a day  multivitamin 1 Tablet(s) Oral daily  thiamine 100 milliGRAM(s) Oral daily    MEDICATIONS  (PRN):  LORazepam     Tablet 1 milliGRAM(s) Oral every 2 hours PRN CIWA-Ar score increase by 2 points and a total score of 7 or less  LORazepam   Injectable 1 milliGRAM(s) IV Push every 2 hours PRN CIWA-Ar score 8 or greater      ALLERGIES: No Known Drug Allergies  shellfish (Rash)      FAMILY HISTORY:      PHYSICAL EXAMINATION:  -----------------------------  ICU Vital Signs Last 24 Hrs  T(C): 36.8 (29 Jul 2018 11:30), Max: 36.8 (29 Jul 2018 11:30)  T(F): 98.3 (29 Jul 2018 11:30), Max: 98.3 (29 Jul 2018 11:30)  HR: 80 (29 Jul 2018 11:30) (65 - 80)  BP: 134/63 (29 Jul 2018 11:30) (134/63 - 168/80)  RR: 18 (29 Jul 2018 11:30) (17 - 18)  SpO2: 99% (29 Jul 2018 11:30) (98% - 100%)      Constitutional: ill appearing/agitated but in no acute distress.   Eyes: the conjunctiva exhibited no abnormalities and the eyelids demonstrated no xanthelasmas.   HEENT: normal oral mucosa, no oral pallor and no oral cyanosis.   Neck: normal jugular venous A waves present, normal jugular venous V waves present and no jugular venous cali A waves.   Pulmonary: no respiratory distress, normal respiratory rhythm and effort, no accessory muscle use and lungs were clear to auscultation bilaterally.   Cardiovascular: heart rate and rhythm were normal, normal S1 and S2 and no murmur, gallop, rub, heave or thrill are present.   Abdomen: soft, non-tender, no hepato-splenomegaly and no abdominal mass palpated.   Musculoskeletal: the gait could not be assessed..   Extremities: no clubbing of the fingernails, no localized cyanosis, no petechial hemorrhages and no ischemic changes.   Skin: normal skin color and pigmentation, no rash, no venous stasis, no skin lesions, no skin ulcer and no xanthoma was observed.   Psychiatric: oriented to person, place, and time; +tremors    LABS:   --------                        12.1   5.59  )-----------( 121      ( 28 Jul 2018 07:34 )             36.6   07-29    145  |  110<H>  |  9   ----------------------------<  128<H>  3.6   |  24  |  1.27    Ca    6.9<L>      29 Jul 2018 04:17  Phos  2.4     07-29  Mg     1.8     07-29    TPro  5.9<L>  /  Alb  2.7<L>  /  TBili  1.5<H>  /  DBili  .38<H>  /  AST  300<H>  /  ALT  149<H>  /  AlkPhos  94  07-29    CARDIAC MARKERS ( 29 Jul 2018 04:17 )  .106 ng/mL / x     / 1951 U/L / x     / x      CARDIAC MARKERS ( 28 Jul 2018 21:42 )  .171 ng/mL / x     / 2032 U/L / x     / x      CARDIAC MARKERS ( 28 Jul 2018 15:39 )  .257 ng/mL / x     / 1652 U/L / x     / x      CARDIAC MARKERS ( 28 Jul 2018 10:34 )  .230 ng/mL / x     / 1417 U/L / x     / x      CARDIAC MARKERS ( 28 Jul 2018 07:34 )  .101 ng/mL / x     / x     / x     / 3.0 ng/mL            RADIOLOGY:  -----------------    ECG: NSR 89bpm; no ischemic changes Patient is a 78y Male with a known history of :    HPI:  78m hx of HTN and etoh abuse (per niece) admitted after fall vs syncope.  Patient was in a different room when niece heard a thud. She found him on the ground face down. Patient did not recall falling. He denies chest pain/palpitations/dyspnea.  Per niece, his memory has slowly been deteriorating.  Denied current EtOH abuse; last drink 1-2 days ago... drank 3-4 drinks/day prior.  EtOH level negative.  CE mildly positive... trop 0.1 -> 0.2.  CK 1400.  Admitted with EtoH withdrawl/confusion/tremors and RIVER with elevated LFTs.    REVIEW OF SYSTEMS:    CONSTITUTIONAL: confused; +tremors  EYES: No eye pain, visual disturbances, or discharge  ENMT:  No difficulty hearing, tinnitus, vertigo; No sinus or throat pain  NECK: No pain or stiffness  BREASTS: No pain, masses, or nipple discharge  RESPIRATORY: No cough, wheezing, chills or hemoptysis; No shortness of breath  CARDIOVASCULAR: No chest pain, palpitations, dizziness, or leg swelling  GASTROINTESTINAL: No abdominal or epigastric pain. No nausea, vomiting, or hematemesis; No diarrhea or constipation. No melena or hematochezia.  GENITOURINARY: No dysuria, frequency, hematuria, or incontinence  NEUROLOGICAL: No headaches, memory loss, loss of strength, numbness, or tremors  SKIN: No itching, burning, rashes, or lesions   LYMPH NODES: No enlarged glands  ENDOCRINE: No heat or cold intolerance; No hair loss  MUSCULOSKELETAL: No joint pain or swelling; No muscle, back, or extremity pain  PSYCHIATRIC: No depression, anxiety, mood swings, or difficulty sleeping  HEME/LYMPH: No easy bruising, or bleeding gums  ALLERGY AND IMMUNOLOGIC: No hives or eczema    MEDICATIONS  (STANDING):  amLODIPine   Tablet 5 milliGRAM(s) Oral daily  aspirin enteric coated 81 milliGRAM(s) Oral daily  dextrose 5% + sodium chloride 0.45%. 1000 milliLiter(s) (75 mL/Hr) IV Continuous <Continuous>  LORazepam   Injectable 1.5 milliGRAM(s) IV Push every 4 hours  LORazepam   Injectable   IV Push   metoprolol tartrate 12.5 milliGRAM(s) Oral two times a day  multivitamin 1 Tablet(s) Oral daily  thiamine 100 milliGRAM(s) Oral daily    MEDICATIONS  (PRN):  LORazepam     Tablet 1 milliGRAM(s) Oral every 2 hours PRN CIWA-Ar score increase by 2 points and a total score of 7 or less  LORazepam   Injectable 1 milliGRAM(s) IV Push every 2 hours PRN CIWA-Ar score 8 or greater      ALLERGIES: No Known Drug Allergies  shellfish (Rash)      FAMILY HISTORY:      PHYSICAL EXAMINATION:  -----------------------------  ICU Vital Signs Last 24 Hrs  T(C): 36.8 (29 Jul 2018 11:30), Max: 36.8 (29 Jul 2018 11:30)  T(F): 98.3 (29 Jul 2018 11:30), Max: 98.3 (29 Jul 2018 11:30)  HR: 80 (29 Jul 2018 11:30) (65 - 80)  BP: 134/63 (29 Jul 2018 11:30) (134/63 - 168/80)  RR: 18 (29 Jul 2018 11:30) (17 - 18)  SpO2: 99% (29 Jul 2018 11:30) (98% - 100%)      Constitutional: ill appearing/agitated but in no acute distress.   Eyes: the conjunctiva exhibited no abnormalities and the eyelids demonstrated no xanthelasmas.   HEENT: normal oral mucosa, no oral pallor and no oral cyanosis.   Neck: normal jugular venous A waves present, normal jugular venous V waves present and no jugular venous cali A waves.   Pulmonary: no respiratory distress, normal respiratory rhythm and effort, no accessory muscle use and lungs were clear to auscultation bilaterally.   Cardiovascular: heart rate and rhythm were normal, normal S1 and S2 and no murmur, gallop, rub, heave or thrill are present.   Abdomen: soft, non-tender, no hepato-splenomegaly and no abdominal mass palpated.   Musculoskeletal: the gait could not be assessed..   Extremities: no clubbing of the fingernails, no localized cyanosis, no petechial hemorrhages and no ischemic changes.   Skin: normal skin color and pigmentation, no rash, no venous stasis, no skin lesions, no skin ulcer and no xanthoma was observed.   Psychiatric: oriented to person, place, and time; +tremors    LABS:   --------                        12.1   5.59  )-----------( 121      ( 28 Jul 2018 07:34 )             36.6   07-29    145  |  110<H>  |  9   ----------------------------<  128<H>  3.6   |  24  |  1.27    Ca    6.9<L>      29 Jul 2018 04:17  Phos  2.4     07-29  Mg     1.8     07-29    TPro  5.9<L>  /  Alb  2.7<L>  /  TBili  1.5<H>  /  DBili  .38<H>  /  AST  300<H>  /  ALT  149<H>  /  AlkPhos  94  07-29    CARDIAC MARKERS ( 29 Jul 2018 04:17 )  .106 ng/mL / x     / 1951 U/L / x     / x      CARDIAC MARKERS ( 28 Jul 2018 21:42 )  .171 ng/mL / x     / 2032 U/L / x     / x      CARDIAC MARKERS ( 28 Jul 2018 15:39 )  .257 ng/mL / x     / 1652 U/L / x     / x      CARDIAC MARKERS ( 28 Jul 2018 10:34 )  .230 ng/mL / x     / 1417 U/L / x     / x      CARDIAC MARKERS ( 28 Jul 2018 07:34 )  .101 ng/mL / x     / x     / x     / 3.0 ng/mL            RADIOLOGY:  -----------------    ECG: NSR 89bpm; no ischemic changes    < from: TTE Echo Doppler w/o Cont (07.29.18 @ 09:30) >   1. Left ventricular ejection fraction, by visual estimation,is 55 to   60%.   2. Normal global left ventricular systolic function.   3. Mild mitral annular calcification.   4. Mild mitral valve regurgitation.   5. Thickening and calcification of the anterior and posterior mitral   valve leaflets.   6. Mild tricuspid regurgitation.   7. Sclerotic aortic valve with normal opening.   8. The aortic valve mean gradient is 2.4 mmHg consistent with normally   opening aortic valve.    < end of copied text >

## 2018-07-30 LAB
ALBUMIN SERPL ELPH-MCNC: 2.8 G/DL — LOW (ref 3.3–5)
ALP SERPL-CCNC: 117 U/L — SIGNIFICANT CHANGE UP (ref 40–120)
ALT FLD-CCNC: 136 U/L — HIGH (ref 12–78)
ANION GAP SERPL CALC-SCNC: 10 MMOL/L — SIGNIFICANT CHANGE UP (ref 5–17)
AST SERPL-CCNC: 178 U/L — HIGH (ref 15–37)
BILIRUB SERPL-MCNC: 1 MG/DL — SIGNIFICANT CHANGE UP (ref 0.2–1.2)
BUN SERPL-MCNC: 10 MG/DL — SIGNIFICANT CHANGE UP (ref 7–23)
CALCIUM SERPL-MCNC: 7.6 MG/DL — LOW (ref 8.5–10.1)
CHLORIDE SERPL-SCNC: 109 MMOL/L — HIGH (ref 96–108)
CO2 SERPL-SCNC: 25 MMOL/L — SIGNIFICANT CHANGE UP (ref 22–31)
CREAT SERPL-MCNC: 1.24 MG/DL — SIGNIFICANT CHANGE UP (ref 0.5–1.3)
GLUCOSE SERPL-MCNC: 105 MG/DL — HIGH (ref 70–99)
HCT VFR BLD CALC: 38 % — LOW (ref 39–50)
HGB BLD-MCNC: 12 G/DL — LOW (ref 13–17)
MAGNESIUM SERPL-MCNC: 1.7 MG/DL — SIGNIFICANT CHANGE UP (ref 1.6–2.6)
MCHC RBC-ENTMCNC: 27.1 PG — SIGNIFICANT CHANGE UP (ref 27–34)
MCHC RBC-ENTMCNC: 31.6 GM/DL — LOW (ref 32–36)
MCV RBC AUTO: 85.8 FL — SIGNIFICANT CHANGE UP (ref 80–100)
NRBC # BLD: 0 /100 WBCS — SIGNIFICANT CHANGE UP (ref 0–0)
PHOSPHATE SERPL-MCNC: 2.2 MG/DL — LOW (ref 2.5–4.5)
PLATELET # BLD AUTO: 115 K/UL — LOW (ref 150–400)
POTASSIUM SERPL-MCNC: 4.2 MMOL/L — SIGNIFICANT CHANGE UP (ref 3.5–5.3)
POTASSIUM SERPL-SCNC: 4.2 MMOL/L — SIGNIFICANT CHANGE UP (ref 3.5–5.3)
PROT SERPL-MCNC: 6.8 GM/DL — SIGNIFICANT CHANGE UP (ref 6–8.3)
RBC # BLD: 4.43 M/UL — SIGNIFICANT CHANGE UP (ref 4.2–5.8)
RBC # FLD: 17.6 % — HIGH (ref 10.3–14.5)
SODIUM SERPL-SCNC: 144 MMOL/L — SIGNIFICANT CHANGE UP (ref 135–145)
WBC # BLD: 4.22 K/UL — SIGNIFICANT CHANGE UP (ref 3.8–10.5)
WBC # FLD AUTO: 4.22 K/UL — SIGNIFICANT CHANGE UP (ref 3.8–10.5)

## 2018-07-30 RX ADMIN — Medication 1.5 MILLIGRAM(S): at 10:52

## 2018-07-30 RX ADMIN — Medication 1 TABLET(S): at 11:00

## 2018-07-30 RX ADMIN — AMLODIPINE BESYLATE 5 MILLIGRAM(S): 2.5 TABLET ORAL at 06:07

## 2018-07-30 RX ADMIN — Medication 81 MILLIGRAM(S): at 11:01

## 2018-07-30 RX ADMIN — Medication 12.5 MILLIGRAM(S): at 17:22

## 2018-07-30 RX ADMIN — SODIUM CHLORIDE 75 MILLILITER(S): 9 INJECTION, SOLUTION INTRAVENOUS at 05:58

## 2018-07-30 RX ADMIN — SODIUM CHLORIDE 75 MILLILITER(S): 9 INJECTION, SOLUTION INTRAVENOUS at 17:22

## 2018-07-30 RX ADMIN — Medication 1.5 MILLIGRAM(S): at 05:58

## 2018-07-30 RX ADMIN — Medication 100 MILLIGRAM(S): at 11:01

## 2018-07-30 RX ADMIN — Medication 1 MILLIGRAM(S): at 14:58

## 2018-07-30 RX ADMIN — Medication 1 MILLIGRAM(S): at 17:22

## 2018-07-30 RX ADMIN — Medication 1.5 MILLIGRAM(S): at 02:20

## 2018-07-30 RX ADMIN — Medication 12.5 MILLIGRAM(S): at 06:00

## 2018-07-30 RX ADMIN — Medication 1 MILLIGRAM(S): at 21:52

## 2018-07-30 NOTE — PROGRESS NOTE ADULT - SUBJECTIVE AND OBJECTIVE BOX
INTERVAL HPI/OVERNIGHT EVENTS:        REVIEW OF SYSTEMS:  CONSTITUTIONAL:  no  complaints      MEDICATION:  amLODIPine   Tablet 5 milliGRAM(s) Oral daily  aspirin enteric coated 81 milliGRAM(s) Oral daily  dextrose 5% + sodium chloride 0.45%. 1000 milliLiter(s) IV Continuous <Continuous>  LORazepam     Tablet 1 milliGRAM(s) Oral every 2 hours PRN  LORazepam   Injectable 1.5 milliGRAM(s) IV Push every 4 hours  LORazepam   Injectable 1 milliGRAM(s) IV Push every 4 hours  LORazepam   Injectable 1 milliGRAM(s) IV Push every 2 hours PRN  LORazepam   Injectable   IV Push   metoprolol tartrate 12.5 milliGRAM(s) Oral two times a day  multivitamin 1 Tablet(s) Oral daily  thiamine 100 milliGRAM(s) Oral daily    Vital Signs Last 24 Hrs  T(C): 36.6 (30 Jul 2018 05:22), Max: 36.9 (29 Jul 2018 23:46)  T(F): 97.8 (30 Jul 2018 05:22), Max: 98.5 (29 Jul 2018 23:46)  HR: 73 (30 Jul 2018 05:22) (73 - 96)  BP: 149/88 (30 Jul 2018 05:22) (134/63 - 155/77)  BP(mean): --  RR: 18 (30 Jul 2018 05:22) (18 - 18)  SpO2: 100% (30 Jul 2018 05:22) (99% - 100%)    PHYSICAL EXAM:  GENERAL: NAD, well-groomed, well-developed  EYES:  conjunctiva and sclera clear  ENMT:  Moist mucous membranes,   NECK: Supple, No JVD, Normal thyroid  NERVOUS SYSTEM:  Alert confused  no  focal  deficits;   CHEST/LUNG: Clear    HEART: Regular rate and rhythm; No murmurs, rubs, or gallops  ABDOMEN: Soft, Nontender, Nondistended; Bowel sounds present  EXTREMITIES:  no  edema no  tenderness  SKIN: No rashes   LABS:                        12.0   4.22  )-----------( 115      ( 30 Jul 2018 06:02 )             38.0     07-30    144  |  109<H>  |  10  ----------------------------<  105<H>  4.2   |  25  |  1.24    Ca    7.6<L>      30 Jul 2018 06:06  Phos  2.2     07-30  Mg     1.7     07-30    TPro  6.8  /  Alb  2.8<L>  /  TBili  1.0  /  DBili  x   /  AST  178<H>  /  ALT  136<H>  /  AlkPhos  117  07-30        CAPILLARY BLOOD GLUCOSE          RADIOLOGY & ADDITIONAL TESTS:    Imaging reports  Personally Reviewed:  [ x] YES  [ ] NO    Consultant(s) Notes Reviewed:  [ ] YES  [ ] NO    Care Discussed with Consultants/Other Providers [x ] YES  [ ] NO  Problem/Plan - 1:  ·  Problem: Alcohol withdrawal syndrome without complication.  Plan: etoh  withdrawl  protocol  monitor  on  telemetry    Problem/Plan - 2:  ·  Problem: Elevated troponin I level.  Plan: serial troponins  monitor  on  telelmtry  furthe w/u  and  treatment  asper  clinical  course.     Problem/Plan - 3:  ·  Problem: HTN (hypertension).  Plan: diet  norvasc.

## 2018-07-31 LAB
T PALLIDUM AB TITR SER: NEGATIVE — SIGNIFICANT CHANGE UP
VIT B12 SERPL-MCNC: 935 PG/ML — SIGNIFICANT CHANGE UP (ref 232–1245)

## 2018-07-31 PROCEDURE — 76700 US EXAM ABDOM COMPLETE: CPT | Mod: 26

## 2018-07-31 RX ADMIN — Medication 81 MILLIGRAM(S): at 11:50

## 2018-07-31 RX ADMIN — Medication 1 MILLIGRAM(S): at 05:46

## 2018-07-31 RX ADMIN — Medication 0.5 MILLIGRAM(S): at 17:25

## 2018-07-31 RX ADMIN — Medication 12.5 MILLIGRAM(S): at 05:46

## 2018-07-31 RX ADMIN — AMLODIPINE BESYLATE 5 MILLIGRAM(S): 2.5 TABLET ORAL at 05:46

## 2018-07-31 RX ADMIN — Medication 12.5 MILLIGRAM(S): at 17:26

## 2018-07-31 RX ADMIN — Medication 100 MILLIGRAM(S): at 11:50

## 2018-07-31 RX ADMIN — Medication 1 MILLIGRAM(S): at 11:49

## 2018-07-31 RX ADMIN — Medication 1 MILLIGRAM(S): at 02:59

## 2018-07-31 RX ADMIN — SODIUM CHLORIDE 75 MILLILITER(S): 9 INJECTION, SOLUTION INTRAVENOUS at 08:04

## 2018-07-31 RX ADMIN — Medication 1 TABLET(S): at 11:50

## 2018-07-31 RX ADMIN — Medication 0.5 MILLIGRAM(S): at 14:27

## 2018-07-31 RX ADMIN — Medication 0.5 MILLIGRAM(S): at 22:12

## 2018-07-31 NOTE — PHYSICAL THERAPY INITIAL EVALUATION ADULT - GAIT DEVIATIONS NOTED, PT EVAL
decreased velocity of limb motion/decreased step length/increased time in double stance/decreased maddi

## 2018-07-31 NOTE — PROGRESS NOTE ADULT - SUBJECTIVE AND OBJECTIVE BOX
INTERVAL HPI/OVERNIGHT EVENTS:        REVIEW OF SYSTEMS:  CONSTITUTIONAL:  alert  confused  cooperative    NECK: No pain or stiffnes  RESPIRATORY: No SOB   CARDIOVASCULAR: No chest pain, palpitations, dizziness,   GASTROINTESTINAL: No abdominal pain. No nausea, vomiting,   NEUROLOGICAL: No headaches, no  blurry  vision no  dizziness  SKIN: No itching,   MUSCULOSKELETAL: No pain    MEDICATION:  amLODIPine   Tablet 5 milliGRAM(s) Oral daily  aspirin enteric coated 81 milliGRAM(s) Oral daily  dextrose 5% + sodium chloride 0.45%. 1000 milliLiter(s) IV Continuous <Continuous>  LORazepam     Tablet 1 milliGRAM(s) Oral every 2 hours PRN  LORazepam   Injectable 1 milliGRAM(s) IV Push every 4 hours  LORazepam   Injectable 0.5 milliGRAM(s) IV Push every 4 hours  LORazepam   Injectable 1 milliGRAM(s) IV Push every 2 hours PRN  LORazepam   Injectable   IV Push   metoprolol tartrate 12.5 milliGRAM(s) Oral two times a day  multivitamin 1 Tablet(s) Oral daily  thiamine 100 milliGRAM(s) Oral daily    Vital Signs Last 24 Hrs  T(C): 36.7 (31 Jul 2018 05:37), Max: 37.1 (30 Jul 2018 23:40)  T(F): 98.1 (31 Jul 2018 05:37), Max: 98.7 (30 Jul 2018 23:40)  HR: 71 (31 Jul 2018 05:37) (71 - 97)  BP: 156/93 (31 Jul 2018 05:37) (121/70 - 156/93)  BP(mean): --  RR: 18 (31 Jul 2018 05:37) (10 - 18)  SpO2: 100% (31 Jul 2018 05:37) (100% - 100%)    PHYSICAL EXAM:  GENERAL: NAD, well-groomed, well-developed  EYES:  conjunctiva and sclera clear  ENMT:  Moist mucous membranes,   NECK: Supple, No JVD, Normal thyroid  NERVOUS SYSTEM:  Alert oriented   no  focal  deficits;   CHEST/LUNG: Clear    HEART: Regular rate and rhythm; No murmurs, rubs, or gallops  ABDOMEN: Soft, Nontender, Nondistended; Bowel sounds present  EXTREMITIES:  no  edema no  tenderness  SKIN: No rashes   LABS:                        12.0   4.22  )-----------( 115      ( 30 Jul 2018 06:02 )             38.0     07-30    144  |  109<H>  |  10  ----------------------------<  105<H>  4.2   |  25  |  1.24    Ca    7.6<L>      30 Jul 2018 06:06  Phos  2.2     07-30  Mg     1.7     07-30    TPro  6.8  /  Alb  2.8<L>  /  TBili  1.0  /  DBili  x   /  AST  178<H>  /  ALT  136<H>  /  AlkPhos  117  07-30        CAPILLARY BLOOD GLUCOSE          RADIOLOGY & ADDITIONAL TESTS:    Imaging reports  Personally Reviewed:  [ x] YES  [ ] NO    Consultant(s) Notes Reviewed:  [x ] YES  [ ] NO    Care Discussed with Consultants/Other Providers [ x] YES  [ ] NO  Care Discussed with Consultants/Other Providers [x ] YES  [ ] NO  Problem/Plan - 1:  ·  Problem: Alcohol withdrawal syndrome without complication.  Plan: etoh  withdrawl  protocol  monitor  on  telemetry    Problem/Plan - 2:  ·  Problem: Elevated troponin I level.  Plan: serial troponins  monitor  on  telelmtry  furthe w/u  and  treatment  asper  clinical  course.     Problem/Plan - 3:  ·  Problem: HTN (hypertension).  Plan: diet  norvasc.   PT  eval

## 2018-07-31 NOTE — PHYSICAL THERAPY INITIAL EVALUATION ADULT - CRITERIA FOR SKILLED THERAPEUTIC INTERVENTIONS
impairments found/functional limitations in following categories/subacute rehab/predicted duration of therapy intervention/anticipated discharge recommendation/therapy frequency/risk reduction/prevention/rehab potential

## 2018-08-01 LAB
ALBUMIN SERPL ELPH-MCNC: 3 G/DL — LOW (ref 3.3–5)
ALP SERPL-CCNC: 107 U/L — SIGNIFICANT CHANGE UP (ref 40–120)
ALT FLD-CCNC: 98 U/L — HIGH (ref 12–78)
ANION GAP SERPL CALC-SCNC: 7 MMOL/L — SIGNIFICANT CHANGE UP (ref 5–17)
AST SERPL-CCNC: 82 U/L — HIGH (ref 15–37)
BILIRUB SERPL-MCNC: 0.6 MG/DL — SIGNIFICANT CHANGE UP (ref 0.2–1.2)
BUN SERPL-MCNC: 13 MG/DL — SIGNIFICANT CHANGE UP (ref 7–23)
CALCIUM SERPL-MCNC: 8.3 MG/DL — LOW (ref 8.5–10.1)
CHLORIDE SERPL-SCNC: 107 MMOL/L — SIGNIFICANT CHANGE UP (ref 96–108)
CO2 SERPL-SCNC: 29 MMOL/L — SIGNIFICANT CHANGE UP (ref 22–31)
CREAT SERPL-MCNC: 1.15 MG/DL — SIGNIFICANT CHANGE UP (ref 0.5–1.3)
GLUCOSE SERPL-MCNC: 105 MG/DL — HIGH (ref 70–99)
HCT VFR BLD CALC: 36.1 % — LOW (ref 39–50)
HGB BLD-MCNC: 11.8 G/DL — LOW (ref 13–17)
MCHC RBC-ENTMCNC: 27.6 PG — SIGNIFICANT CHANGE UP (ref 27–34)
MCHC RBC-ENTMCNC: 32.7 GM/DL — SIGNIFICANT CHANGE UP (ref 32–36)
MCV RBC AUTO: 84.5 FL — SIGNIFICANT CHANGE UP (ref 80–100)
NRBC # BLD: 0 /100 WBCS — SIGNIFICANT CHANGE UP (ref 0–0)
PLATELET # BLD AUTO: 156 K/UL — SIGNIFICANT CHANGE UP (ref 150–400)
POTASSIUM SERPL-MCNC: 4.1 MMOL/L — SIGNIFICANT CHANGE UP (ref 3.5–5.3)
POTASSIUM SERPL-SCNC: 4.1 MMOL/L — SIGNIFICANT CHANGE UP (ref 3.5–5.3)
PROT SERPL-MCNC: 6.8 GM/DL — SIGNIFICANT CHANGE UP (ref 6–8.3)
RBC # BLD: 4.27 M/UL — SIGNIFICANT CHANGE UP (ref 4.2–5.8)
RBC # FLD: 18.3 % — HIGH (ref 10.3–14.5)
SODIUM SERPL-SCNC: 143 MMOL/L — SIGNIFICANT CHANGE UP (ref 135–145)
WBC # BLD: 4.61 K/UL — SIGNIFICANT CHANGE UP (ref 3.8–10.5)
WBC # FLD AUTO: 4.61 K/UL — SIGNIFICANT CHANGE UP (ref 3.8–10.5)

## 2018-08-01 RX ADMIN — AMLODIPINE BESYLATE 5 MILLIGRAM(S): 2.5 TABLET ORAL at 06:09

## 2018-08-01 RX ADMIN — Medication 0.5 MILLIGRAM(S): at 06:09

## 2018-08-01 RX ADMIN — Medication 12.5 MILLIGRAM(S): at 06:09

## 2018-08-01 RX ADMIN — Medication 1 TABLET(S): at 12:42

## 2018-08-01 RX ADMIN — Medication 1 MILLIGRAM(S): at 12:49

## 2018-08-01 RX ADMIN — Medication 0.5 MILLIGRAM(S): at 09:56

## 2018-08-01 RX ADMIN — Medication 100 MILLIGRAM(S): at 12:43

## 2018-08-01 RX ADMIN — Medication 12.5 MILLIGRAM(S): at 18:09

## 2018-08-01 RX ADMIN — Medication 1 MILLIGRAM(S): at 00:09

## 2018-08-01 RX ADMIN — Medication 81 MILLIGRAM(S): at 12:42

## 2018-08-01 RX ADMIN — Medication 0.5 MILLIGRAM(S): at 02:16

## 2018-08-01 NOTE — PROGRESS NOTE ADULT - SUBJECTIVE AND OBJECTIVE BOX
INTERVAL HPI/OVERNIGHT EVENTS:        REVIEW OF SYSTEMS:  CONSTITUTIONAL:  no  complaints    NECK: No pain or stiffnes  RESPIRATORY: No SOB   CARDIOVASCULAR: No chest pain, palpitations, dizziness,   GASTROINTESTINAL: No abdominal pain. No nausea, vomiting,   NEUROLOGICAL: No headaches, no  blurry  vision no  dizziness  SKIN: No itching,   MUSCULOSKELETAL: No pain    MEDICATION:  amLODIPine   Tablet 5 milliGRAM(s) Oral daily  aspirin enteric coated 81 milliGRAM(s) Oral daily  dextrose 5% + sodium chloride 0.45%. 1000 milliLiter(s) IV Continuous <Continuous>  LORazepam     Tablet 1 milliGRAM(s) Oral every 2 hours PRN  LORazepam   Injectable 0.5 milliGRAM(s) IV Push every 4 hours  LORazepam   Injectable 0.5 milliGRAM(s) IV Push every 12 hours  LORazepam   Injectable 1 milliGRAM(s) IV Push every 2 hours PRN  LORazepam   Injectable   IV Push   metoprolol tartrate 12.5 milliGRAM(s) Oral two times a day  multivitamin 1 Tablet(s) Oral daily  thiamine 100 milliGRAM(s) Oral daily    Vital Signs Last 24 Hrs  T(C): 37 (01 Aug 2018 05:31), Max: 37 (01 Aug 2018 05:31)  T(F): 98.6 (01 Aug 2018 05:31), Max: 98.6 (01 Aug 2018 05:31)  HR: 93 (01 Aug 2018 05:31) (86 - 109)  BP: 145/92 (01 Aug 2018 05:31) (131/58 - 176/95)  BP(mean): --  RR: 18 (01 Aug 2018 05:31) (17 - 18)  SpO2: 98% (01 Aug 2018 05:31) (98% - 100%)    PHYSICAL EXAM:  GENERAL: NAD, well-groomed, well-developed  EYES:  conjunctiva and sclera clear  ENMT:  Moist mucous membranes,   NECK: Supple, No JVD, Normal thyroid  NERVOUS SYSTEM:  Alert oriented   no  focal  deficits;   CHEST/LUNG: Clear    HEART: Regular rate and rhythm; No murmurs, rubs, or gallops  ABDOMEN: Soft, Nontender, Nondistended; Bowel sounds present  EXTREMITIES:  no  edema no  tenderness  SKIN: No rashes   LABS:              CAPILLARY BLOOD GLUCOSE          RADIOLOGY & ADDITIONAL TESTS:    Imaging reports  Personally Reviewed:  [x ] YES  [ ] NO    Consultant(s) Notes Reviewed:  [ x] YES  [ ] NO    Care Discussed with Consultants/Other Providers [ x] YES  [ ] NO  Problem/Plan - 1:  ·  Problem: Alcohol withdrawal syndrome without complication.  Plan: etoh  withdrawl  protocol  monitor  on  telemetry    Problem/Plan - 2:  ·  Problem: Elevated troponin I level.  Plan: serial troponins  monitor  on  telelmtry  furthe w/u  and  treatment  asper  clinical  course.     Problem/Plan - 3:  ·  Problem: HTN (hypertension).  Plan: diet  norvasc. for  sub acute  rehab

## 2018-08-01 NOTE — CONSULT NOTE ADULT - SUBJECTIVE AND OBJECTIVE BOX
Patient is a 78y old  Male who presents with a chief complaint of etoh  withdrawl  possible syncope (28 Jul 2018 09:30)    HPI: Patient  brought  to  er  because  patient  reported  found  on  floor  worsening  confusion  tremulousness  EtOH  abuse (28 Jul 2018 09:30)    Currently     REVIEW OF SYSTEMS as above  Constitutional - No fever, No weight loss, No fatigue  HEENT - No neck pain  Respiratory - No cough, No shortness of breath  Cardiovascular - No chest pain, No palpitations  Gastrointestinal - No abdominal pain, No nausea, No vomiting  Genitourinary - No dysuria, No frequency  Neurological - No headaches, No loss of strength, No numbness, No tremors  Skin - No lesions   Endocrine - No temperature intolerance  Musculoskeletal - No joint pain  Psychiatric - No depression, No anxiety    PAST MEDICAL & SURGICAL HISTORY  HTN (hypertension)  Alcohol abuse    No significant past surgical history    SOCHX: Lives with family on 2nd floor of home - flight of stairs.  No tobacco.  Alcohol abuse - Denies current EtOH abuse but last drink 1 day ago.  Typically at least 3-4 drinks a day.     FMHX: FA/MO  - contributory     ALLERGIES  No Known Drug Allergies  shellfish (Rash)    MEDICATIONS reviewed  MEDICATIONS  (STANDING):  amLODIPine   Tablet 5 milliGRAM(s) Oral daily  aspirin enteric coated 81 milliGRAM(s) Oral daily  LORazepam   Injectable 0.5 milliGRAM(s) IV Push every 12 hours  LORazepam   Injectable   IV Push   metoprolol tartrate 12.5 milliGRAM(s) Oral two times a day  multivitamin 1 Tablet(s) Oral daily  thiamine 100 milliGRAM(s) Oral daily    MEDICATIONS  (PRN):  LORazepam     Tablet 1 milliGRAM(s) Oral every 2 hours PRN CIWA-Ar score increase by 2 points and a total score of 7 or less  LORazepam   Injectable 1 milliGRAM(s) IV Push every 2 hours PRN CIWA-Ar score 8 or greater      VITALS  T(C): 37 (08-01-18 @ 05:31), Max: 37 (08-01-18 @ 05:31)  HR: 93 (08-01-18 @ 05:31) (86 - 96)  BP: 145/92 (08-01-18 @ 05:31) (145/92 - 176/95)  RR: 18 (08-01-18 @ 05:31) (17 - 18)  SpO2: 98% (08-01-18 @ 05:31) (98% - 100%)  Wt(kg): -- 68 kg    PHYSICAL EXAM  BMI - 25.7  Constitutional - NAD, Comfortable  HEENT - NCAT, EOMI  Neck - Supple, functional ROM  Cardiovascular - RRR  Abdomen - Soft, Not tender  Extremities - Functional range of motion   Neurologic -                    Cognitive - Awake, Alert, Oriented     Speech Intact     Language  Intact     Motor - No focal deficits     Sensory - Intact to LT     Reflexes - DTR Intact     Psychiatric - Mood       RECENT LABS reviewed  CBC Full  -  ( 01 Aug 2018 10:35 )  WBC Count : 4.61 K/uL  Hemoglobin : 11.8 g/dL  Hematocrit : 36.1 %  Platelet Count - Automated : 156 K/uL  Mean Cell Volume : 84.5 fl  Mean Cell Hemoglobin : 27.6 pg  Mean Cell Hemoglobin Concentration : 32.7 gm/dL      08-01    143  |  107  |  13  ----------------------------<  105<H>  4.1   |  29  |  1.15    Ca    8.3<L>      01 Aug 2018 10:35    TPro  6.8  /  Alb  3.0<L>  /  TBili  0.6  /  DBili  x   /  AST  82<H>  /  ALT  98<H>  /  AlkPhos  107  08-01    IMAGING reviewed:  CT brain - no acute findings  CT C spine - degen changes, no fractures  TTE - unremarkable, nl EF 55-60%  US Abd - complex renal cyst      Admitted - detox protocol.    Cardio - Trop elev to .257 related to alcohol withdrawal, fall.      FUNCTIONAL HISTORY    CURRENT FUNCTIONAL STATUS    IMPRESSION:     PLAN: Will follow - recommendations will depend upon clinical and functional course. Patient is a 78y old  Male who presents with a chief complaint of etoh  withdrawl  possible syncope (28 Jul 2018 09:30)    HPI: Patient  brought  to  er  because  patient  reported  found  on  floor  worsening  confusion  tremulousness  EtOH  abuse (28 Jul 2018 09:30)    Currently does not recall falling, or why he is here.  Confabulates that he drove his car here because he did not know how to drive home - requesting his keys so he can now drive home.     Complains of right upper back pain - not severe. Does not recall falling.  Denies neck any other pain.     REVIEW OF SYSTEMS Confused, poor historian - as above    PAST MEDICAL & SURGICAL HISTORY  HTN (hypertension)  Alcohol abuse    No significant past surgical history    SOCHX: Lives with wife and niece on 2nd floor of 2 family home - flight of stairs. Niece states he has been deteriorating functionally.   No tobacco.  Alcohol abuse - Denies current EtOH abuse but last drink 1 day ago.  Typically at least 3-4 drinks a day.     FMHX: FA/MO  Noncontributory     ALLERGIES  No Known Drug Allergies  shellfish (Rash)    MEDICATIONS reviewed  MEDICATIONS  (STANDING):  amLODIPine   Tablet 5 milliGRAM(s) Oral daily  aspirin enteric coated 81 milliGRAM(s) Oral daily  LORazepam   Injectable 0.5 milliGRAM(s) IV Push every 12 hours  LORazepam   Injectable   IV Push   metoprolol tartrate 12.5 milliGRAM(s) Oral two times a day  multivitamin 1 Tablet(s) Oral daily  thiamine 100 milliGRAM(s) Oral daily    MEDICATIONS  (PRN):  LORazepam     Tablet 1 milliGRAM(s) Oral every 2 hours PRN CIWA-Ar score increase by 2 points and a total score of 7 or less  LORazepam   Injectable 1 milliGRAM(s) IV Push every 2 hours PRN CIWA-Ar score 8 or greater      VITALS  T(C): 37 (08-01-18 @ 05:31), Max: 37 (08-01-18 @ 05:31)  HR: 93 (08-01-18 @ 05:31) (86 - 96)  BP: 145/92 (08-01-18 @ 05:31) (145/92 - 176/95)  RR: 18 (08-01-18 @ 05:31) (17 - 18)  SpO2: 98% (08-01-18 @ 05:31) (98% - 100%)  Wt(kg): -- 68 kg    PHYSICAL EXAM  BMI - 25.7  Constitutional - NAD, Comfortable in bedside chair  HEENT - NCAT, EOMI. Counts fingers easily with R eye, may have difficulty with L eye but able to do so. Poor dentition  Neck - Supple, functional ROM  Cardiovascular - RRR  Abdomen - Soft, Not tender  Extremities - Functional range of motion X4.  Mild tenderness in R scapular region. No point tenderness or bruise noted.  Neurologic -                    Cognitive - Awake, Alert, Oriented X2, cognition slow - knows he is in a hospital, repeatedly said this is "1970", but knows his age is 78.     Speech Intact     Language  Intact     Motor - No focal deficits appreciated     Sensory - Appears to be decreased in both feet.     Reflexes - DTR absent BLEs     Psychiatric - Mood somewhat anxious.     RECENT LABS reviewed  CBC Full  -  ( 01 Aug 2018 10:35 )  WBC Count : 4.61 K/uL  Hemoglobin : 11.8 g/dL  Hematocrit : 36.1 %  Platelet Count - Automated : 156 K/uL  Mean Cell Volume : 84.5 fl  Mean Cell Hemoglobin : 27.6 pg  Mean Cell Hemoglobin Concentration : 32.7 gm/dL      08-01    143  |  107  |  13  ----------------------------<  105<H>  4.1   |  29  |  1.15    Ca    8.3<L>      01 Aug 2018 10:35    TPro  6.8  /  Alb  3.0<L>  /  TBili  0.6  /  DBili  x   /  AST  82<H>  /  ALT  98<H>  /  AlkPhos  107  08-01    IMAGING reviewed:  CT brain - no acute findings  CT C spine - degen changes, no fractures  TTE - unremarkable, nl EF 55-60%  US Abd - complex renal cyst    Admitted - detox protocol.    Cardio - Trop elev to .257 related to alcohol withdrawal, fall.    FUNCTIONAL HISTORY States he was ind. in ADLs, ambulation without AD, but deteriorating.    CURRENT FUNCTIONAL STATUS Amb 50 feet with RW and therapist-Min A.     IMPRESSION: 78 M PMHx alcohol abuse, found on floor - likely withdrawal, with associated elevation in Troponin, LFTs.  Neuropathy on exam.    PLAN: Will follow - recommendations will depend upon clinical and functional course.

## 2018-08-02 LAB
ALBUMIN SERPL ELPH-MCNC: 3 G/DL — LOW (ref 3.3–5)
ALP SERPL-CCNC: 103 U/L — SIGNIFICANT CHANGE UP (ref 40–120)
ALT FLD-CCNC: 94 U/L — HIGH (ref 12–78)
ANION GAP SERPL CALC-SCNC: 9 MMOL/L — SIGNIFICANT CHANGE UP (ref 5–17)
AST SERPL-CCNC: 74 U/L — HIGH (ref 15–37)
BILIRUB SERPL-MCNC: 0.9 MG/DL — SIGNIFICANT CHANGE UP (ref 0.2–1.2)
BUN SERPL-MCNC: 19 MG/DL — SIGNIFICANT CHANGE UP (ref 7–23)
CALCIUM SERPL-MCNC: 8.3 MG/DL — LOW (ref 8.5–10.1)
CHLORIDE SERPL-SCNC: 109 MMOL/L — HIGH (ref 96–108)
CO2 SERPL-SCNC: 25 MMOL/L — SIGNIFICANT CHANGE UP (ref 22–31)
CREAT SERPL-MCNC: 1.26 MG/DL — SIGNIFICANT CHANGE UP (ref 0.5–1.3)
GLUCOSE SERPL-MCNC: 106 MG/DL — HIGH (ref 70–99)
HCT VFR BLD CALC: 37.6 % — LOW (ref 39–50)
HGB BLD-MCNC: 12 G/DL — LOW (ref 13–17)
MAGNESIUM SERPL-MCNC: 1.6 MG/DL — SIGNIFICANT CHANGE UP (ref 1.6–2.6)
MCHC RBC-ENTMCNC: 27.3 PG — SIGNIFICANT CHANGE UP (ref 27–34)
MCHC RBC-ENTMCNC: 31.9 GM/DL — LOW (ref 32–36)
MCV RBC AUTO: 85.5 FL — SIGNIFICANT CHANGE UP (ref 80–100)
NRBC # BLD: 0 /100 WBCS — SIGNIFICANT CHANGE UP (ref 0–0)
PHOSPHATE SERPL-MCNC: 3.2 MG/DL — SIGNIFICANT CHANGE UP (ref 2.5–4.5)
PLATELET # BLD AUTO: 150 K/UL — SIGNIFICANT CHANGE UP (ref 150–400)
POTASSIUM SERPL-MCNC: 4.6 MMOL/L — SIGNIFICANT CHANGE UP (ref 3.5–5.3)
POTASSIUM SERPL-SCNC: 4.6 MMOL/L — SIGNIFICANT CHANGE UP (ref 3.5–5.3)
PROT SERPL-MCNC: 7.2 GM/DL — SIGNIFICANT CHANGE UP (ref 6–8.3)
RBC # BLD: 4.4 M/UL — SIGNIFICANT CHANGE UP (ref 4.2–5.8)
RBC # FLD: 18.9 % — HIGH (ref 10.3–14.5)
SODIUM SERPL-SCNC: 143 MMOL/L — SIGNIFICANT CHANGE UP (ref 135–145)
WBC # BLD: 4.3 K/UL — SIGNIFICANT CHANGE UP (ref 3.8–10.5)
WBC # FLD AUTO: 4.3 K/UL — SIGNIFICANT CHANGE UP (ref 3.8–10.5)

## 2018-08-02 RX ADMIN — Medication 100 MILLIGRAM(S): at 11:52

## 2018-08-02 RX ADMIN — Medication 12.5 MILLIGRAM(S): at 05:53

## 2018-08-02 RX ADMIN — AMLODIPINE BESYLATE 5 MILLIGRAM(S): 2.5 TABLET ORAL at 05:57

## 2018-08-02 RX ADMIN — Medication 12.5 MILLIGRAM(S): at 18:56

## 2018-08-02 RX ADMIN — Medication 0.5 MILLIGRAM(S): at 18:55

## 2018-08-02 RX ADMIN — Medication 0.5 MILLIGRAM(S): at 05:53

## 2018-08-02 RX ADMIN — Medication 1 TABLET(S): at 11:52

## 2018-08-02 RX ADMIN — Medication 81 MILLIGRAM(S): at 11:52

## 2018-08-02 NOTE — PROGRESS NOTE ADULT - SUBJECTIVE AND OBJECTIVE BOX
Patient is a 78y old  Male who presents with a chief complaint of etoh  withdrawl  possible syncope (28 Jul 2018 09:30)    HPI: Patient  brought  to  er  because  patient  reported  found  on  floor  worsening  confusion  tremulousness  EtOH  abuse (28 Jul 2018 09:30)    Currently does not recall falling, or why he is here.  Confabulates that he drove his car here because he did not know how to drive home - requesting his keys so he can now drive home.     No longer complains of right upper back pain.  Still does not recall falling.  Denies neck any other pain. No new complaints.    REVIEW OF SYSTEMS Confused, poor historian - as above    PAST MEDICAL & SURGICAL HISTORY  HTN (hypertension)  Alcohol abuse    No significant past surgical history    SOCHX: Lives with wife and niece on 2nd floor of 2 family home - flight of stairs. Niece states he has been deteriorating functionally.   No tobacco.  Alcohol abuse - Denies current EtOH abuse but last drink 1 day ago.  Typically at least 3-4 drinks a day.     FMHX: FA/MO  Noncontributory     ALLERGIES  No Known Drug Allergies  shellfish (Rash)    MEDICATIONS reviewed    Wt(kg): -- 68 kg    PHYSICAL EXAM  BMI - 25.7  Constitutional - NAD, Comfortable in close observation bed.  Cardiovascular - RRR  Abdomen - Soft, Not tender  Extremities - Functional range of motion X4.   Neurologic -                    Cognitive - Awake, Alert, Oriented X2, cognition slow     Speech Intact     Language  Intact     Motor - No focal deficits appreciated     Psychiatric - Mood less anxious.     RECENT LABS reviewed    IMAGING reviewed:  CT brain - no acute findings  CT C spine - degen changes, no fractures  TTE - unremarkable, nl EF 55-60%  US Abd - complex renal cyst    Admitted - detox protocol.    Cardio - Trop elev to .257 related to alcohol withdrawal, fall.    FUNCTIONAL HISTORY States he was ind. in ADLs, ambulation without AD, but deteriorating.    CURRENT FUNCTIONAL STATUS Amb 50 feet with RW and therapist-Min A.     IMPRESSION: 78 M PMHx alcohol abuse, found on floor - likely withdrawal, with associated elevation in Troponin, LFTs.  Neuropathy on exam.    PLAN: Agree with [facility] for restorative therapy under medical supervision.

## 2018-08-02 NOTE — PROGRESS NOTE ADULT - SUBJECTIVE AND OBJECTIVE BOX
INTERVAL HPI/OVERNIGHT EVENTS:        REVIEW OF SYSTEMS:  CONSTITUTIONAL:  no  complaints    NECK: No pain or stiffnes  RESPIRATORY: No SOB   CARDIOVASCULAR: No chest pain, palpitations, dizziness,   GASTROINTESTINAL: No abdominal pain. No nausea, vomiting,   NEUROLOGICAL: No headaches, no  blurry  vision no  dizziness  SKIN: No itching,   MUSCULOSKELETAL: No pain    MEDICATION:  amLODIPine   Tablet 5 milliGRAM(s) Oral daily  aspirin enteric coated 81 milliGRAM(s) Oral daily  LORazepam     Tablet 1 milliGRAM(s) Oral every 2 hours PRN  LORazepam   Injectable 0.5 milliGRAM(s) IV Push every 12 hours  LORazepam   Injectable 1 milliGRAM(s) IV Push every 2 hours PRN  LORazepam   Injectable   IV Push   metoprolol tartrate 12.5 milliGRAM(s) Oral two times a day  multivitamin 1 Tablet(s) Oral daily  thiamine 100 milliGRAM(s) Oral daily    Vital Signs Last 24 Hrs  T(C): 36.6 (02 Aug 2018 14:58), Max: 36.7 (02 Aug 2018 00:23)  T(F): 97.8 (02 Aug 2018 14:58), Max: 98.1 (02 Aug 2018 00:23)  HR: 89 (02 Aug 2018 14:58) (88 - 109)  BP: 142/76 (02 Aug 2018 14:58) (121/67 - 155/82)  BP(mean): --  RR: 18 (02 Aug 2018 14:58) (16 - 18)  SpO2: 96% (02 Aug 2018 14:58) (96% - 100%)    PHYSICAL EXAM:  GENERAL: NAD, well-groomed, well-developed  EYES:  conjunctiva and sclera clear  ENMT:  Moist mucous membranes,   NECK: Supple, No JVD, Normal thyroid  NERVOUS SYSTEM:  Alert oriented x2  no  focal  deficits;   CHEST/LUNG: Clear    HEART: Regular rate and rhythm; No murmurs, rubs, or gallops  ABDOMEN: Soft, Nontender, Nondistended; Bowel sounds present  EXTREMITIES:  no  edema no  tenderness  SKIN: No rashes   LABS:                        12.0   4.30  )-----------( 150      ( 02 Aug 2018 07:08 )             37.6     08-02    143  |  109<H>  |  19  ----------------------------<  106<H>  4.6   |  25  |  1.26    Ca    8.3<L>      02 Aug 2018 07:08  Phos  3.2     08-02  Mg     1.6     08-02    TPro  7.2  /  Alb  3.0<L>  /  TBili  0.9  /  DBili  x   /  AST  74<H>  /  ALT  94<H>  /  AlkPhos  103  08-02        CAPILLARY BLOOD GLUCOSE          RADIOLOGY & ADDITIONAL TESTS:    Imaging reports  Personally Reviewed:  [ ] YES  [ ] NO    Consultant(s) Notes Reviewed:  [ ] YES  [ ] NO    Care Discussed with Consultants/Other Providers [ ] YES  [ ] NO  Problem/Plan - 1:  ·  Problem: Alcohol withdrawal syndrome without complication.  Plan: etoh  withdrawl  protocol  monitor  on  telemetry    Problem/Plan - 2:  ·  Problem: Elevated troponin I level.  Plan: serial troponins  monitor  on  telelmtry  furthe w/u  and  treatment  asper  clinical  course.     Problem/Plan - 3:  ·  Problem: HTN (hypertension).  Plan: diet  norvasc. for  sub acute  rehab

## 2018-08-03 VITALS
DIASTOLIC BLOOD PRESSURE: 676 MMHG | TEMPERATURE: 97 F | HEART RATE: 90 BPM | SYSTOLIC BLOOD PRESSURE: 119 MMHG | RESPIRATION RATE: 17 BRPM | OXYGEN SATURATION: 100 %

## 2018-08-03 RX ORDER — METOPROLOL TARTRATE 50 MG
12.5 TABLET ORAL
Qty: 0 | Refills: 0 | COMMUNITY
Start: 2018-08-03

## 2018-08-03 RX ORDER — ASPIRIN/CALCIUM CARB/MAGNESIUM 324 MG
1 TABLET ORAL
Qty: 0 | Refills: 0 | COMMUNITY
Start: 2018-08-03

## 2018-08-03 RX ORDER — THIAMINE MONONITRATE (VIT B1) 100 MG
1 TABLET ORAL
Qty: 0 | Refills: 0 | COMMUNITY
Start: 2018-08-03

## 2018-08-03 RX ORDER — AMLODIPINE BESYLATE 2.5 MG/1
1 TABLET ORAL
Qty: 0 | Refills: 0 | COMMUNITY
Start: 2018-08-03

## 2018-08-03 RX ADMIN — Medication 12.5 MILLIGRAM(S): at 07:04

## 2018-08-03 RX ADMIN — Medication 81 MILLIGRAM(S): at 11:30

## 2018-08-03 RX ADMIN — Medication 0.5 MILLIGRAM(S): at 07:05

## 2018-08-03 RX ADMIN — AMLODIPINE BESYLATE 5 MILLIGRAM(S): 2.5 TABLET ORAL at 07:04

## 2018-08-03 RX ADMIN — Medication 1 TABLET(S): at 11:30

## 2018-08-03 RX ADMIN — Medication 100 MILLIGRAM(S): at 11:30

## 2018-08-03 NOTE — DISCHARGE NOTE ADULT - CARE PLAN
Principal Discharge DX:	Alcohol withdrawal syndrome without complication  Goal:	avoidance of  recurrence  Assessment and plan of treatment:	rehab  thiamine  mvi  behaviour  modification  Secondary Diagnosis:	Elevated troponin I level  Secondary Diagnosis:	HTN (hypertension)

## 2018-08-03 NOTE — PROGRESS NOTE ADULT - SUBJECTIVE AND OBJECTIVE BOX
INTERVAL HPI/OVERNIGHT EVENTS:        REVIEW OF SYSTEMS:  CONSTITUTIONAL:  feels  well no  complaints    NECK: No pain or stiffnes  RESPIRATORY: No SOB   CARDIOVASCULAR: No chest pain, palpitations, dizziness,   GASTROINTESTINAL: No abdominal pain. No nausea, vomiting,   NEUROLOGICAL: No headaches, no  blurry  vision no  dizziness  SKIN: No itching,   MUSCULOSKELETAL: No pain    MEDICATION:  amLODIPine   Tablet 5 milliGRAM(s) Oral daily  aspirin enteric coated 81 milliGRAM(s) Oral daily  LORazepam     Tablet 1 milliGRAM(s) Oral every 2 hours PRN  LORazepam   Injectable 0.5 milliGRAM(s) IV Push every 12 hours  LORazepam   Injectable 1 milliGRAM(s) IV Push every 2 hours PRN  LORazepam   Injectable   IV Push   metoprolol tartrate 12.5 milliGRAM(s) Oral two times a day  multivitamin 1 Tablet(s) Oral daily  thiamine 100 milliGRAM(s) Oral daily    Vital Signs Last 24 Hrs  T(C): 36.9 (03 Aug 2018 05:35), Max: 36.9 (03 Aug 2018 05:35)  T(F): 98.5 (03 Aug 2018 05:35), Max: 98.5 (03 Aug 2018 05:35)  HR: 77 (03 Aug 2018 05:35) (77 - 109)  BP: 126/64 (03 Aug 2018 05:35) (115/63 - 142/76)  BP(mean): --  RR: 18 (03 Aug 2018 05:35) (17 - 18)  SpO2: 99% (03 Aug 2018 05:35) (96% - 100%)    PHYSICAL EXAM:  GENERAL: NAD, well-groomed, well-developed  EYES:  conjunctiva and sclera clear  ENMT:  Moist mucous membranes,   NECK: Supple, No JVD, Normal thyroid  NERVOUS SYSTEM:  Alert oriented  x2  no  focal  deficits;   CHEST/LUNG: Clear    HEART: Regular rate and rhythm; No murmurs, rubs, or gallops  ABDOMEN: Soft, Nontender, Nondistended; Bowel sounds present  EXTREMITIES:  no  edema no  tenderness  SKIN: No rashes   LABS:                        12.0   4.30  )-----------( 150      ( 02 Aug 2018 07:08 )             37.6     08-02    143  |  109<H>  |  19  ----------------------------<  106<H>  4.6   |  25  |  1.26    Ca    8.3<L>      02 Aug 2018 07:08  Phos  3.2     08-02  Mg     1.6     08-02    TPro  7.2  /  Alb  3.0<L>  /  TBili  0.9  /  DBili  x   /  AST  74<H>  /  ALT  94<H>  /  AlkPhos  103  08-02        CAPILLARY BLOOD GLUCOSE          RADIOLOGY & ADDITIONAL TESTS:    Imaging reports  Personally Reviewed:  [ ] YES  [ ] NO    Consultant(s) Notes Reviewed:  [ ] YES  [ ] NO    Care Discussed with Consultants/Other Providers [ ] YES  [ ] NO

## 2018-08-03 NOTE — DISCHARGE NOTE ADULT - MEDICATION SUMMARY - MEDICATIONS TO TAKE
I will START or STAY ON the medications listed below when I get home from the hospital:    aspirin 81 mg oral delayed release tablet  -- 1 tab(s) by mouth once a day  -- Indication: For prophylaxis    metoprolol  -- 12.5 milligram(s) by mouth 2 times a day  -- Indication: For HTN (hypertension)    amLODIPine 5 mg oral tablet  -- 1 tab(s) by mouth once a day  -- Indication: For HTN (hypertension)    Multiple Vitamins oral tablet  -- 1 tab(s) by mouth once a day  -- Indication: For ETOH  abuse    thiamine 100 mg oral tablet  -- 1 tab(s) by mouth once a day  -- Indication: For ETOH abuse

## 2018-08-03 NOTE — DISCHARGE NOTE ADULT - HOSPITAL COURSE
patient  treated  with  Lorazepam  [facility] protocol  for  etoh  abuse  was  monitored  on  telemetry secondary  to  mild  troponin  elevation had  agitation  tremors  requiring  IV  lorazepam  administration  had  gradual  improvement  troponins  with  downward  trend  evaluated  by  cardiology  Physiatry  progress  discussed  with  family  discharged  to  rehab

## 2018-08-03 NOTE — PROGRESS NOTE ADULT - SUBJECTIVE AND OBJECTIVE BOX
Patient is a 78y old  Male who presents with a chief complaint of etoh  withdrawl  possible syncope (28 Jul 2018 09:30)    HPI: Patient  brought  to  er  because  patient  reported  found  on  floor  worsening  confusion  tremulousness  EtOH  abuse (28 Jul 2018 09:30)    Currently does not recall falling, or why he is here.  Confabulates that he drove his car here because he did not know how to drive home - requesting his keys so he can now drive home.     He has no complaints.  Still does not recall falling.      REVIEW OF SYSTEMS Confused, poor historian - as above    PAST MEDICAL & SURGICAL HISTORY  HTN (hypertension)  Alcohol abuse    No significant past surgical history    SOCHX: Lives with wife and niece on 2nd floor of 2 family home - flight of stairs. Niece states he has been deteriorating functionally.   No tobacco.  Alcohol abuse - Denies current EtOH abuse but last drink 1 day ago.  Typically at least 3-4 drinks a day.     FMHX: FA/MO  Noncontributory     ALLERGIES  No Known Drug Allergies  shellfish (Rash)    MEDICATIONS reviewed    Wt(kg): -- 68 kg    PHYSICAL EXAM  BMI - 25.7  Constitutional - NAD, Comfortable in close observation bed.  Cardiovascular - RRR  Abdomen - Soft, Not tender  Extremities - Functional range of motion X4.   Neurologic -                    Cognitive - Awake, Alert, Oriented X2, cognition slow     Speech Intact     Language  Intact     Motor - No focal deficits appreciated     Psychiatric - Mood less anxious.     RECENT LABS reviewed    IMAGING reviewed:  CT brain - no acute findings  CT C spine - degen changes, no fractures  TTE - unremarkable, nl EF 55-60%  US Abd - complex renal cyst    Admitted - detox protocol.    Cardio - Trop elev to .257 related to alcohol withdrawal, fall.    FUNCTIONAL HISTORY States he was ind. in ADLs, ambulation without AD, but deteriorating.    CURRENT FUNCTIONAL STATUS Amb 100 feet with RW - therapist.     IMPRESSION: 78 M PMHx alcohol abuse, found on floor - likely withdrawal, with associated elevation in Troponin, LFTs.  Neuropathy on exam.    PLAN: He states he is going to his sister-in-laws home - 1 step entrance, bedroom, etc. on 1st floor - will not go to a rehab.  Discussed fall risks and prevention, and the need to stop drinking as he may not survive next time. He should participate in a support program such as AA.  He states he understands and will not drink anymore.

## 2018-08-03 NOTE — PROGRESS NOTE ADULT - PROVIDER SPECIALTY LIST ADULT
Cardiology
Internal Medicine
Physiatry
Internal Medicine
Internal Medicine
Physiatry

## 2018-08-03 NOTE — DISCHARGE NOTE ADULT - PATIENT PORTAL LINK FT
You can access the [facility] Portal, offered by [facility], by registering with the following website: [email or web address]

## 2018-08-08 DIAGNOSIS — F10.239 ALCOHOL DEPENDENCE WITH WITHDRAWAL, UNSPECIFIED: ICD-10-CM

## 2018-08-08 DIAGNOSIS — R25.1 TREMOR, UNSPECIFIED: ICD-10-CM

## 2018-08-08 DIAGNOSIS — N17.9 ACUTE KIDNEY FAILURE, UNSPECIFIED: ICD-10-CM

## 2018-08-08 DIAGNOSIS — F10.230 ALCOHOL DEPENDENCE WITH WITHDRAWAL, UNCOMPLICATED: ICD-10-CM

## 2018-08-08 DIAGNOSIS — I10 ESSENTIAL (PRIMARY) HYPERTENSION: ICD-10-CM

## 2018-08-08 DIAGNOSIS — I44.0 ATRIOVENTRICULAR BLOCK, FIRST DEGREE: ICD-10-CM
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
## 2019-04-23 ENCOUNTER — EMERGENCY (EMERGENCY)
Facility: HOSPITAL | Age: 79
LOS: 0 days | Discharge: HOME | End: 2019-04-23
Attending: EMERGENCY MEDICINE
Payer: MEDICARE

## 2019-04-23 VITALS
DIASTOLIC BLOOD PRESSURE: 93 MMHG | TEMPERATURE: 99 F | RESPIRATION RATE: 17 BRPM | SYSTOLIC BLOOD PRESSURE: 156 MMHG | HEART RATE: 93 BPM | OXYGEN SATURATION: 100 %

## 2019-04-23 VITALS
WEIGHT: 139.99 LBS | HEART RATE: 99 BPM | OXYGEN SATURATION: 99 % | SYSTOLIC BLOOD PRESSURE: 148 MMHG | HEIGHT: 65.5 IN | DIASTOLIC BLOOD PRESSURE: 83 MMHG | RESPIRATION RATE: 16 BRPM | TEMPERATURE: 99 F

## 2019-04-23 DIAGNOSIS — F10.10 ALCOHOL ABUSE, UNCOMPLICATED: ICD-10-CM

## 2019-04-23 DIAGNOSIS — E11.9 TYPE 2 DIABETES MELLITUS WITHOUT COMPLICATIONS: ICD-10-CM

## 2019-04-23 DIAGNOSIS — I10 ESSENTIAL (PRIMARY) HYPERTENSION: ICD-10-CM

## 2019-04-23 DIAGNOSIS — Z79.82 LONG TERM (CURRENT) USE OF ASPIRIN: ICD-10-CM

## 2019-04-23 DIAGNOSIS — M25.441 EFFUSION, RIGHT HAND: ICD-10-CM

## 2019-04-23 DIAGNOSIS — M79.641 PAIN IN RIGHT HAND: ICD-10-CM

## 2019-04-23 DIAGNOSIS — Z91.013 ALLERGY TO SEAFOOD: ICD-10-CM

## 2019-04-23 LAB
ALBUMIN SERPL ELPH-MCNC: 3.5 G/DL — SIGNIFICANT CHANGE UP (ref 3.3–5)
ALP SERPL-CCNC: 77 U/L — SIGNIFICANT CHANGE UP (ref 40–120)
ALT FLD-CCNC: 15 U/L — SIGNIFICANT CHANGE UP (ref 12–78)
ANION GAP SERPL CALC-SCNC: 9 MMOL/L — SIGNIFICANT CHANGE UP (ref 5–17)
AST SERPL-CCNC: 18 U/L — SIGNIFICANT CHANGE UP (ref 15–37)
BASOPHILS # BLD AUTO: 0.03 K/UL — SIGNIFICANT CHANGE UP (ref 0–0.2)
BASOPHILS NFR BLD AUTO: 0.5 % — SIGNIFICANT CHANGE UP (ref 0–2)
BILIRUB SERPL-MCNC: 0.6 MG/DL — SIGNIFICANT CHANGE UP (ref 0.2–1.2)
BUN SERPL-MCNC: 21 MG/DL — SIGNIFICANT CHANGE UP (ref 7–23)
CALCIUM SERPL-MCNC: 9.4 MG/DL — SIGNIFICANT CHANGE UP (ref 8.5–10.1)
CHLORIDE SERPL-SCNC: 108 MMOL/L — SIGNIFICANT CHANGE UP (ref 96–108)
CO2 SERPL-SCNC: 25 MMOL/L — SIGNIFICANT CHANGE UP (ref 22–31)
CREAT SERPL-MCNC: 1.2 MG/DL — SIGNIFICANT CHANGE UP (ref 0.5–1.3)
CRP SERPL-MCNC: 13.44 MG/DL — HIGH (ref 0–0.4)
EOSINOPHIL # BLD AUTO: 0.06 K/UL — SIGNIFICANT CHANGE UP (ref 0–0.5)
EOSINOPHIL NFR BLD AUTO: 1 % — SIGNIFICANT CHANGE UP (ref 0–6)
ERYTHROCYTE [SEDIMENTATION RATE] IN BLOOD: 87 MM/HR — HIGH (ref 0–20)
GLUCOSE SERPL-MCNC: 82 MG/DL — SIGNIFICANT CHANGE UP (ref 70–99)
HCT VFR BLD CALC: 34.3 % — LOW (ref 39–50)
HGB BLD-MCNC: 10.5 G/DL — LOW (ref 13–17)
IMM GRANULOCYTES NFR BLD AUTO: 0.2 % — SIGNIFICANT CHANGE UP (ref 0–1.5)
LYMPHOCYTES # BLD AUTO: 1.12 K/UL — SIGNIFICANT CHANGE UP (ref 1–3.3)
LYMPHOCYTES # BLD AUTO: 18.6 % — SIGNIFICANT CHANGE UP (ref 13–44)
MCHC RBC-ENTMCNC: 27.1 PG — SIGNIFICANT CHANGE UP (ref 27–34)
MCHC RBC-ENTMCNC: 30.6 GM/DL — LOW (ref 32–36)
MCV RBC AUTO: 88.4 FL — SIGNIFICANT CHANGE UP (ref 80–100)
MONOCYTES # BLD AUTO: 0.64 K/UL — SIGNIFICANT CHANGE UP (ref 0–0.9)
MONOCYTES NFR BLD AUTO: 10.6 % — SIGNIFICANT CHANGE UP (ref 2–14)
NEUTROPHILS # BLD AUTO: 4.16 K/UL — SIGNIFICANT CHANGE UP (ref 1.8–7.4)
NEUTROPHILS NFR BLD AUTO: 69.1 % — SIGNIFICANT CHANGE UP (ref 43–77)
NRBC # BLD: 0 /100 WBCS — SIGNIFICANT CHANGE UP (ref 0–0)
PLATELET # BLD AUTO: 270 K/UL — SIGNIFICANT CHANGE UP (ref 150–400)
POTASSIUM SERPL-MCNC: 4 MMOL/L — SIGNIFICANT CHANGE UP (ref 3.5–5.3)
POTASSIUM SERPL-SCNC: 4 MMOL/L — SIGNIFICANT CHANGE UP (ref 3.5–5.3)
PROT SERPL-MCNC: 7.9 GM/DL — SIGNIFICANT CHANGE UP (ref 6–8.3)
RBC # BLD: 3.88 M/UL — LOW (ref 4.2–5.8)
RBC # FLD: 15.5 % — HIGH (ref 10.3–14.5)
SODIUM SERPL-SCNC: 142 MMOL/L — SIGNIFICANT CHANGE UP (ref 135–145)
WBC # BLD: 6.02 K/UL — SIGNIFICANT CHANGE UP (ref 3.8–10.5)
WBC # FLD AUTO: 6.02 K/UL — SIGNIFICANT CHANGE UP (ref 3.8–10.5)

## 2019-04-23 PROCEDURE — 99284 EMERGENCY DEPT VISIT MOD MDM: CPT

## 2019-04-23 PROCEDURE — 73130 X-RAY EXAM OF HAND: CPT | Mod: 26,RT

## 2019-04-23 RX ORDER — LISINOPRIL 2.5 MG/1
1 TABLET ORAL
Qty: 30 | Refills: 0 | OUTPATIENT
Start: 2019-04-23 | End: 2019-05-22

## 2019-04-23 RX ADMIN — Medication 500 MILLIGRAM(S): at 14:12

## 2019-04-23 NOTE — ED PROVIDER NOTE - PROGRESS NOTE DETAILS
No
Pt labs wnl, xray negative, referred to hand and ortho, ok for d/c. Feeling better after naprosyn.

## 2019-04-23 NOTE — ED PROVIDER NOTE - MUSCULOSKELETAL MINIMAL EXAM
R. hand swelling. No fusiform swelling, no pain along flexor tendons, no pain with extension, and no flexed presentation

## 2019-04-23 NOTE — ED ADULT TRIAGE NOTE - CHIEF COMPLAINT QUOTE
Swelling to right hand getting worse going up his arm. Fingers are swollen and he is having pain to hand  to the wrist. Fingers are stiff from the swelling. He did gardening last week next day hand was a little swollen. He is right hand dominant. Right hand, fingers and wrist and lower arm swelling The skin is discolored,, pulses  and sensation present

## 2019-04-23 NOTE — ED ADULT NURSE NOTE - OBJECTIVE STATEMENT
pt c/o of left ankle swelling 2 weeks ago. Pmh gout. Pt c.o right hand swelling new onset 3 days ago. Pt c/o of worsening pain with hand grasp, pain 7/10, hand moderate swelling. Pt history of alcoholism, last drink 2 weeks ago.

## 2019-04-23 NOTE — ED PROVIDER NOTE - OBJECTIVE STATEMENT
Pt is a 78 yo gentleman with a pmhx of HTN, HL, DM who presents to the ED with hand pain. This has started 3 days ago. It is to his R. hand and has had swelling. No injuries, no falls, no abrasions or breaks to the skin, no hx of this in the past. No hx of gout. No fevers, no redness. No swelling of fingers, just of hand.

## 2019-04-23 NOTE — ED ADULT TRIAGE NOTE - TEMPERATURE IN CELSIUS (DEGREES C)
37.4 Advancement Flap (Single) Text: The defect edges were debeveled with a #15 scalpel blade.  Given the location of the defect and the proximity to free margins a single advancement flap was deemed most appropriate.  Using a sterile surgical marker, an appropriate advancement flap was drawn incorporating the defect and placing the expected incisions within the relaxed skin tension lines where possible.    The area thus outlined was incised deep to adipose tissue with a #15 scalpel blade.  The skin margins were undermined to an appropriate distance in all directions utilizing iris scissors.

## 2019-04-23 NOTE — ED PROVIDER NOTE - CLINICAL SUMMARY MEDICAL DECISION MAKING FREE TEXT BOX
Ddx: gout/ Occult fracture/ kanavel signs negative for flexor tenosynovitis/ no signs of cellulitis  Plan: Cbc, cmp, esr, crp, xray, pain control, reassess

## 2019-10-21 NOTE — PATIENT PROFILE ADULT. - [facility]
Chief complaint:   Chief Complaint   Patient presents with   • Atrial Fibrillation       Vitals:  There were no vitals taken for this visit.    HISTORY OF PRESENT ILLNESS     Atrial Fibrillation   Symptoms are negative for chest pain, dizziness, palpitations and shortness of breath. Past medical history includes atrial fibrillation.     He is here in follow up s/p Watchman CHRISTINE occulusion device implant (ASAP TOO trial) on 10/3/2017.  His medical history is pertinent for chronic AF, DM, and mitral and aortic valve stenosis.  He sustained a subdural hematoma 1/7/17 due to a fall and cannot be on coumadin.  Since watchman implant he reports stable.      Echo on 9/11/17 showed normal LVEF at 65%.  Patient is also following with Dr. Najera in neurology.      Pt states that he is doing fine.  He did note that he fell twice but denies any loss of consciousness.  He also states that he is continuously full of fluid and notes that he is fatigue.  He does note that he takes Bumex which helps.  He denies any other cardiac issues.     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     • Subdural hematoma (CMS/[facility]) 01/18/2017     Priority: Low     Hospitalized at [facility] 1/18-2/21/17. 1/19-craniotomy for evacuation of left SDH. 1/31-2/3-subdural drain. Course complicated by wound  infection. 2/3- SDH washout. Craniotomy and subdural drain placement and bone flap removal. Developed left cranial empyema and underwent dimitri hole and evacuation of left subdural empyema on 2/7 with placement of subdural drain. Drain removed on 2/10.    5/22/17:  S/p right cranioplasty with titanium mesh and methylmethacrylate      • Lower extremity venous stasis 11/14/2016     Priority: Low   • Abnormal liver function tests 05/11/2016     Priority: Low   • Diabetes mellitus with neurological manifestations (CMS/HCC) 05/06/2015     Priority: Low   • Proteinuria 11/10/2010     Priority: Low   • MONOCLONAL GAMMOPATHY 11/16/2009     Priority: Low   • INDERJIT on CPAP 01/26/2009     Priority: Low     1/3/19: Home sleep study showed: Severe obstructive sleep apnea syndrome associated with significant hypoxemia.    Home poly 1/3/19  [employer]  APAP 5-18  Linked [employer]     • Persistent disorder of initiating or maintaining sleep 01/26/2009     Priority: Low   • Family history of ischemic heart disease 11/05/2007     Priority: Low   • STASIS DERMATITIS  11/05/2007     Priority: Low   • Benign neoplasm of colon      Priority: Low   • Microscopic hematuria 09/06/2005     Priority: Low   • Chronic atrial fibrillation 04/01/2002     Priority: Low     Unable to take anticoagulation due to subdural hematoma 1/2017  Echo 4/2/19 - LVEF 70%  CHADSVASC - 2 (age, DM)     • Hyperlipidemia 04/01/2002     Priority: Low       PAST MEDICAL, FAMILY AND SOCIAL HISTORY     Medications:  Current Outpatient Medications   Medication   • atorvastatin (LIPITOR) 20 MG tablet   • CARTIA  MG 24 hr capsule   • bumetanide (BUMEX) 1 MG tablet   • metoPROLOL tartrate (LOPRESSOR) 50 MG tablet   • Probiotic Product (ALIGN PO)   • aspirin 81 MG tablet   • Multiple Vitamins-Minerals (MULTIVITAMIN ADULTS PO)   • acetaminophen (TYLENOL) 500 MG tablet   • potassium chloride (K-DUR,KLOR-CON) 20 MEQ CR tablet     No current facility-administered medications for  this visit.        Allergies:  ALLERGIES:   Allergen Reactions   • Lunesta Other (See Comments)     Mettalic taste       Past Medical  History/Surgeries:  Past Medical History:   Diagnosis Date   • Atrial fibrillation (CMS/[facility]) 10/1998    cardioverted but refuses further attempts.  Does not wish to take amiodarone. Watchman implant done by Dr. Giron. Successful Insertion of the Watchman #30 left atrial appendage occluder device in 9/2017. Complicated by thigh hematoma.   • Benign neoplasm of colon    • Blood clot associated with vein wall inflammation 09/21/2017    wound opened by Dr Jenkins(question DVT vs compartment syndrome) on 9/22/17 and a large hematoma was drained. Pt then followed by wound care.   • C. difficile diarrhea 05/2017   • External thrombosed hemorrhoids 2/93   • Insomnia, unspecified 1/8/2009   • Microscopic hematuria 9/6/2005   • MONOCLONAL GAMMOPATHY 11/16/2009   • Morbid obesity (CMS/[facility]) 5/9/2013   • Nonspecific abnormal results of liver function study    • Obstructive sleep apnea 12/29/2008    Pt used an oral dental appliance from Dr Bar Milligan; sleep study shows severe obstructive sleep apnea with a total apnea-hypopnea index of 76 events/hr.  Desaturation is noted down to 78%.  There is evidence for a sleep maintenance insomnia.  Severe periodic limb movements are noted. Pt started CPAP in 1/2019.   • INDERJIT on CPAP 1/26/2009    1/3/19: Home sleep study showed: Severe obstructive sleep apnea syndrome associated with significant hypoxemia.  Home poly 1/3/19 [employer] APAP 5-18 Linked [employer]core   • Other and unspecified hyperlipidemia    • Periodic limb movement disorder 1/8/2009   • Personal history of traumatic fracture     arm right   • Pleural effusion, bilateral 02/21/2017   • Proteinuria 11/10/2010   • Pulmonary hypertension (CMS/HCC) 02/21/2017   • STASIS DERMATITIS  11/5/2007   • Subdural empyema 02/01/2017    s/p craniotomy for subdural hematoma   • Subdural hematoma, acute (CMS/[facility])  01/18/2017    Hospitalized at [facility] 1/18-2/21/17. 1/19-craniotomy for evacuation of left SDH. 1/31-2/3-subdural drain. Course complicated by wound infection. 2/3- SDH washout. Craniotomy and subdural drain placement and bone flap removal. Developed left cranial empyema and underwent dimitri hole and evacuation of left subdural empyema on 2/7 with placement of subdural drain. Drain removed on 2/10.   • Traumatic hematoma of left lower leg 9/21/2017   • Type 2 diabetes mellitus (CMS/HCC) 10/03/2013    diet-controlled   • Wears glasses 2018       Past Surgical History:   Procedure Laterality Date   • Brain surgery     • Cardioversion N/A ~1995    for Atrial Fib. Pt was converted but went back into A Fib.   • Colonoscopy  3/23/16    Gregory 5 year recall   • Colonoscopy ablate lesion  12/6/05    Hyperplastic polyp, Dr Jenkins   • Cranioplasty for cranial defect Left 05/22/2017    Left cranioplasty with titanium mesh and methylmethacrylate 5/22/17. Dr. Rohan Leal.   • Craniotomy  01/19/2017 1/19-craniotomy for evacuation of left SDH. 1/31-2/3-subdural drain. Course complicated by wound infection. 2/3- SDH washout. Craniotomy and subdural drain placement and bone flap removal. Developed left cranial empyema and underwent dimitri hole and evacuation of left subdural empyema on 2/7 with placement of subdural drain. Drain removed on 2/10.   • Echo heart resting  01/19/2017    LV has nl size and syst funct. EF 52-56%. RA + LA are severely enlarged. Mild-mod MV stenosis. Mild AV stenosis. PASP 45 mmHg.   • Echo heart resting  04/02/2019    Abnormal. See report.   • Echo heart stress  10/98    Normal left ventricular function and motion.  Dilated left atrium.  Exercised to 11 METS, Afib   • Ercp w/ sphincterotomy and balloon dilation  04/25/2019    Dr. Nick.   • Exc skin benign 0.5cm  or less face facial  7/00    chondroid syringoma, left cheek.  Dr. Mohan   • Flexible sigmoidoscopy dx  6/00    Flex Sig w/o Bx-NL to 60  cm   • Knee scope,diagnostic  10/15/13    Left knee arthroscopy with partial lateral meniscectomy   • Laparoscopic cholecystectomy  04/15/2019    Dr. Cardenas.   • Left atrial appendage occlusion N/A 2017   • Nm myocard perf/rest & stress  2019    NL myocardial perfusion scan. Low cardiac risk.  moderate-sized left pleural effusion.   • Past surgical history  2017    Watchman implant done by Dr. Giron. Successful Insertion of the Watchman #30 left atrial appendage occluder device. Complicated by thigh hematoma.   • Sleep study attended  08    sleep study shows severe obstructive sleep apnea with a total apnea-hypopnea index of 76 events/hr.  Desaturation is noted down to 78%.  There is evidence for a sleep maintenance insomnia.  Severe periodic limb movements are noted.   • Sleep study attended  10/5/12    test for cpap effectiveness. It is showing improvement, but not ideal control of sleep apnea.   • Sleep study home 4 channel portable unattended  2019    Severe obstructive sleep apnea syndrome associated with significant hypoxemia on this home study.       Family History:  Family History   Problem Relation Age of Onset   • Heart Mother         afib   • Heart Father         DMR at 68   • Sleep Apnea Brother    • Congestive Heart Failure Sister        Social History:  Social History     Tobacco Use   • Smoking status: Former Smoker     Packs/day: 0.10     Years: 0.25     Pack years: 0.02     Types: Cigarettes     Last attempt to quit: 1965     Years since quittin.8   • Smokeless tobacco: Never Used   • Tobacco comment: Quit , smoked 15 cigarettes total   Substance Use Topics   • Alcohol use: Yes     Alcohol/week: 0.0 - 1.0 standard drinks     Frequency: Never     Drinks per session: 1 or 2     Binge frequency: Never     Comment: 1 beer every two - three weeks       REVIEW OF SYSTEMS     Review of Systems   Constitutional: Positive for fatigue.   Respiratory: Negative for  shortness of breath.    Cardiovascular: Negative for chest pain and palpitations.   Neurological: Negative for dizziness, syncope and light-headedness.       PHYSICAL EXAM     Physical Exam  Vitals signs reviewed.   Constitutional:       General: He is not in acute distress.     Appearance: He is well-developed. He is not diaphoretic.   HENT:      Head: Normocephalic and atraumatic.      Right Ear: External ear normal.      Left Ear: External ear normal.   Eyes:      General:         Right eye: No discharge.         Left eye: No discharge.      Conjunctiva/sclera: Conjunctivae normal.   Neck:      Musculoskeletal: Normal range of motion and neck supple.   Cardiovascular:      Rate and Rhythm: Normal rate. Rhythm irregular.   Pulmonary:      Effort: Pulmonary effort is normal. No respiratory distress.   Musculoskeletal:         General: No tenderness or deformity.      Comments: Ambulatory without assistance   Skin:     General: Skin is warm and dry.      Findings: No rash.   Neurological:      Mental Status: He is alert and oriented to person, place, and time.   Psychiatric:         Behavior: Behavior normal.         Thought Content: Thought content normal.         Judgement: Judgment normal.          Creatinine (mg/dL)   Date Value   08/14/2019 0.96     Ejection Fraction   Date Value Ref Range Status   04/02/2019 70.0 % Final     Telemetry: rhythm strip (Reviewed personally by Dr. Giron) atrial fibrillation    ASSESSMENT/PLAN     Chronic atrial fibrillation  Unable to take anticoagulation due to subdural hematoma 1/2017  Echo 4/2/19 - LVEF 70%  CHADSVASC - 2 (age, DM)    Managed with rate control strategy  - rates well controlled.  He is notes only fatigue, otherwise he is asymptomatic.    1. Will see if patient can see Dr. Valera early for his fluid retention instead of waiting til December 2019.  2. Continue present therapy  3. Follow up in 1 year or earlier if needed.    s/p Watchman LA appendage occlusion device  implant 10/3/2017       Subdural hematoma (CMS/HCC)  Hospitalized at [facility] 1/18-2/21/17. 1/19-craniotomy for evacuation of left SDH. 1/31-2/3-subdural drain. Course complicated by wound infection. 2/3- SDH washout. Craniotomy and subdural drain placement and bone flap removal. Developed left cranial empyema and underwent dimitri hole and evacuation of left subdural empyema on 2/7 with placement of subdural drain. Drain removed on 2/10.    5/22/17:  S/p right cranioplasty with titanium mesh and methylmethacrylate     Return in about 1 year (around 10/21/2020).    On 10/21/2019, IAlanis SCRKIAN scribed the services personally performed by Jamin YIN Sra, MD     The documentation recorded by the scribe accurately and completely reflects the service(s) I personally performed and the decisions made by me.          none

## 2020-02-29 NOTE — ED ADULT TRIAGE NOTE - STATUS:
OT called back and stated that patient BP currently is 170/100 while patient is on speaker during this call  Patient denies any weakness, speech difficulty, headache, dizziness and vision changes and stated that feels fine  OT stated that only today patient BP is high but actually been running low and on 2/23 Bp was 126/78, on 1/26 BP was 100/80 and on 1/27 was 100/60  PT will be in tomorrow and will check BP and will call for any concerns  Patient has no home equipment to check her BP  I discussed with patient in detail about stroke symptoms and if she feels anything, will call squad and will go to ER  Will not increase any BP medication today as only one day with elevated BP but usually running low   RACHAEL Loving [employer]

## 2020-11-06 NOTE — ED PROVIDER NOTE - PMH
Daughter reports her Mother tested positive for Covid; she is resident at [facility]. 11 residents positive, and 3 workers. Still waiting on 16 other results.   Only changes is her appetite has declined; she'll eat a pretty good breakfast but then not Alcohol abuse    HTN (hypertension)

## 2022-11-15 NOTE — PATIENT PROFILE ADULT. - PROVIDER NOTIFICATION
EMERGENCY DEPARTMENT HISTORY AND PHYSICAL EXAM    I have evaluated the patient at 6:57 PM      Date: 11/15/2022  Patient Name: Sujata Nelson    History of Presenting Illness     Chief Complaint   Patient presents with    Chest Pain         History Provided By: Patient  Location/Duration/Severity/Modifying factors   Patient is a 60-year-old female with history of hypertension, hyperlipidemia, type 2 diabetes, GERD, cholelithiasis presenting to the emergency department for evaluation of chest pain. Patient describes 2 days of burning-like chest pain with associated nausea and nonbloody nonbilious emesis worse after eating. She was worried that it might be a gallbladder attack. She is prescribed Prilosec and states she has been taking this as prescribed. Has been taking all her medications as prescribed. She denies any cough or shortness of breath. No fevers or chills. Abdominal pain or diarrhea. No trouble swallowing. PCP: Rico Banks MD    Current Facility-Administered Medications   Medication Dose Route Frequency Provider Last Rate Last Admin    mylanta/viscous lidocaine (GI COCKTAIL)  40 mL Oral ONCE Flavio Willis DO        ondansetron [facility]) injection 4 mg  4 mg IntraVENous NOW Inés Campos DO         Current Outpatient Medications   Medication Sig Dispense Refill    sucralfate (CARAFATE) 1 gram tablet Take 1 Tablet by mouth four (4) times daily for 5 days. 20 Tablet 0    omeprazole (PRILOSEC) 20 mg capsule TAKE 1 CAPSULE BY MOUTH DAILY. 30 Capsule 2    metFORMIN ER (GLUCOPHAGE XR) 750 mg tablet TAKE 1 TABLET BY MOUTH TWO TIMES A  Tablet 3    atorvastatin (Lipitor) 40 mg tablet 1 tablet daily 90 Tablet 3    dulaglutide (TRULICITY) 1.5 WX/8.0 mL sub-q pen 0.5 mL by SubCUTAneous route every seven (7) days. 12 Each 3    losartan (COZAAR) 100 mg tablet TAKE 1 TABLET BY MOUTH ONCE DAILY 90 Tablet 3    hydroCHLOROthiazide (HYDRODIURIL) 12.5 mg tablet Take 1 Tablet by mouth daily. 90 Tablet 3    BD Veo Insulin Syringe UF 1/2 mL 31 gauge x 15/64\" syrg       insulin glargine (Lantus Solostar U-100 Insulin) 100 unit/mL (3 mL) inpn INJECT 50 UNITS UNDER THE SKIN ONCE DAILY 15 Adjustable Dose Pre-filled Pen Syringe 11    Insulin Needles, Disposable, (Pen Needle) 32 gauge x 5/32\" ndle For insulin shots 100 Pen Needle 11    TRUEplus Lancets 28 gauge misc USE TO CHECK THE BLOOD GLUCOSE ONCE A  Lancet 10    apixaban (Eliquis) 5 mg tablet TAKE 1 TABLET BY MOUTH TWO (2) TIMES A DAY. 180 Tablet 3    glucose blood VI test strips (True Metrix Glucose Test Strip) strip Test Blood Glucose once daily; ICD 10 E11.9, Quantity 100 100 Strip 3    lancets misc Use to check the blood glucose once a day 100 Each 11    melatonin 5 mg cap capsule Take 1 Capsule by mouth nightly. 90 Capsule 3    Blood-Glucose Meter (Contour Next EZ Meter) monitoring kit Test sugar daily 1 Kit 0    Blood-Glucose Meter (TRUE METRIX AIR GLUCOSE METER) monitoring kit Test Blood Glucose once daily; ICD 10 E11.9 1 Kit 0       Past History     Past Medical History:  Past Medical History:   Diagnosis Date    Essential hypertension, benign     Mixed hyperlipidemia     Type II or unspecified type diabetes mellitus without mention of complication, not stated as uncontrolled        Past Surgical History:  Past Surgical History:   Procedure Laterality Date    HX HYSTERECTOMY  36s    X2       Family History:  Family History   Problem Relation Age of Onset    Diabetes Mother     Stroke Maternal Grandmother     Stroke Maternal Grandfather     Hypertension Sister     Diabetes Sister        Social History:  Social History     Tobacco Use    Smoking status: Never    Smokeless tobacco: Never   Substance Use Topics    Alcohol use: No     Alcohol/week: 0.0 standard drinks    Drug use: No       Allergies:   Allergies   Allergen Reactions    Clindamycin Hives    Sulfa (Sulfonamide Antibiotics) Unable to Obtain    Lisinopril Cough         Review of Systems       Review of Systems   Constitutional:  Negative for activity change, chills, diaphoresis, fatigue and fever. Eyes:  Negative for photophobia, pain and visual disturbance. Respiratory:  Negative for cough, chest tightness, shortness of breath, wheezing and stridor. Cardiovascular:  Positive for chest pain. Negative for palpitations. Gastrointestinal:  Positive for nausea and vomiting. Negative for abdominal distention, abdominal pain, constipation and diarrhea. Genitourinary:  Negative for difficulty urinating, dysuria and hematuria. Musculoskeletal:  Negative for back pain, joint swelling and myalgias. Skin:  Negative for rash and wound. Neurological:  Negative for dizziness, weakness and headaches. Psychiatric/Behavioral:  Negative for agitation. The patient is not nervous/anxious. Physical Exam   Visit Vitals  BP (!) 149/86 (BP 1 Location: Left upper arm, BP Patient Position: Sitting)   Pulse 96   Temp 98.8 °F (37.1 °C)   Resp 18   Ht 5' 6\" (1.676 m)   Wt 81.6 kg (180 lb)   SpO2 98%   BMI 29.05 kg/m²         Physical Exam  Constitutional:       General: She is not in acute distress. Appearance: She is not toxic-appearing. HENT:      Head: Normocephalic and atraumatic. Mouth/Throat:      Mouth: Mucous membranes are moist.   Eyes:      Extraocular Movements: Extraocular movements intact. Pupils: Pupils are equal, round, and reactive to light. Cardiovascular:      Rate and Rhythm: Normal rate and regular rhythm. Heart sounds: Normal heart sounds. No murmur heard. No friction rub. No gallop. Pulmonary:      Effort: Pulmonary effort is normal.      Breath sounds: Normal breath sounds. Chest:      Chest wall: No mass, deformity or tenderness. Abdominal:      General: There is no distension. Palpations: Abdomen is soft. There is no mass. Tenderness: There is no abdominal tenderness. There is no guarding. Hernia: No hernia is present. Musculoskeletal:         General: No swelling, tenderness or deformity. Cervical back: Normal range of motion and neck supple. Skin:     General: Skin is warm and dry. Capillary Refill: Capillary refill takes less than 2 seconds. Findings: No rash. Neurological:      General: No focal deficit present. Mental Status: She is alert and oriented to person, place, and time. Psychiatric:         Mood and Affect: Mood normal.         Diagnostic Study Results     Labs -  Recent Results (from the past 12 hour(s))   EKG, 12 LEAD, INITIAL    Collection Time: 11/15/22  3:59 PM   Result Value Ref Range    Ventricular Rate 93 BPM    Atrial Rate 93 BPM    P-R Interval 136 ms    QRS Duration 90 ms    Q-T Interval 394 ms    QTC Calculation (Bezet) 489 ms    Calculated P Axis 56 degrees    Calculated R Axis 49 degrees    Calculated T Axis 66 degrees    Diagnosis       Sinus rhythm with premature atrial complexes with aberrant conduction  Otherwise normal ECG  When compared with ECG of 24-JAN-2022 01:40,  aberrant conduction is now present     CBC WITH AUTOMATED DIFF    Collection Time: 11/15/22  4:00 PM   Result Value Ref Range    WBC 11.4 4.6 - 13.2 K/uL    RBC 4.58 4.20 - 5.30 M/uL    HGB 11.6 (L) 12.0 - 16.0 g/dL    HCT 35.9 35.0 - 45.0 %    MCV 78.4 78.0 - 100.0 FL    MCH 25.3 24.0 - 34.0 PG    MCHC 32.3 31.0 - 37.0 g/dL    RDW 14.9 (H) 11.6 - 14.5 %    PLATELET 103 759 - 077 K/uL    MPV 9.3 9.2 - 11.8 FL    NRBC 0.0 0  WBC    ABSOLUTE NRBC 0.00 0.00 - 0.01 K/uL    NEUTROPHILS 85 (H) 40 - 73 %    LYMPHOCYTES 12 (L) 21 - 52 %    MONOCYTES 3 3 - 10 %    EOSINOPHILS 0 0 - 5 %    BASOPHILS 0 0 - 2 %    IMMATURE GRANULOCYTES 0 0.0 - 0.5 %    ABS. NEUTROPHILS 9.7 (H) 1.8 - 8.0 K/UL    ABS. LYMPHOCYTES 1.3 0.9 - 3.6 K/UL    ABS. MONOCYTES 0.4 0.05 - 1.2 K/UL    ABS. EOSINOPHILS 0.0 0.0 - 0.4 K/UL    ABS. BASOPHILS 0.0 0.0 - 0.1 K/UL    ABS. IMM.  GRANS. 0.0 0.00 - 0.04 K/UL    DF AUTOMATED     METABOLIC PANEL, COMPREHENSIVE    Collection Time: 11/15/22  4:00 PM   Result Value Ref Range    Sodium 141 136 - 145 mmol/L    Potassium 3.9 3.5 - 5.5 mmol/L    Chloride 105 100 - 111 mmol/L    CO2 27 21 - 32 mmol/L    Anion gap 9 3.0 - 18 mmol/L    Glucose 118 (H) 74 - 99 mg/dL    BUN 36 (H) 7.0 - 18 MG/DL    Creatinine 1.08 0.6 - 1.3 MG/DL    BUN/Creatinine ratio 33 (H) 12 - 20      eGFR 58 (L) >60 ml/min/1.73m2    Calcium 9.0 8.5 - 10.1 MG/DL    Bilirubin, total 0.7 0.2 - 1.0 MG/DL    ALT (SGPT) 23 13 - 56 U/L    AST (SGOT) 25 10 - 38 U/L    Alk. phosphatase 148 (H) 45 - 117 U/L    Protein, total 7.8 6.4 - 8.2 g/dL    Albumin 4.3 3.4 - 5.0 g/dL    Globulin 3.5 2.0 - 4.0 g/dL    A-G Ratio 1.2 0.8 - 1.7     TROPONIN-HIGH SENSITIVITY    Collection Time: 11/15/22  4:00 PM   Result Value Ref Range    Troponin-High Sensitivity 8 0 - 54 ng/L   NT-PRO BNP    Collection Time: 11/15/22  4:00 PM   Result Value Ref Range    NT pro- 0 - 900 PG/ML       Radiologic Studies -   XR CHEST PORT    (Results Pending)         Medical Decision Making   I am the first provider for this patient. I reviewed the vital signs, available nursing notes, past medical history, past surgical history, family history and social history. Vital Signs-Reviewed the patient's vital signs. EKG: Aberrant conduction. No STEMI    Records Reviewed: Nursing Notes, Old Medical Records, Previous electrocardiograms, Previous Radiology Studies, and Previous Laboratory Studies (Time of Review: 6:57 PM)    ED Course: Progress Notes, Reevaluation, and Consults:         Provider Notes (Medical Decision Making):   MDM  Number of Diagnoses or Management Options  Gastroesophageal reflux disease, unspecified whether esophagitis present  Diagnosis management comments: 81-year-old female presenting for evaluation of chest pain. Based on history and HPI is most likely to be due to gastroesophageal reflux.   She is in no acute distress on my exam.  Vital signs stable. Her lung sounds are clear and heart sounds are normal.  No epigastric or right upper quadrant tenderness. Screening lab work was obtained including troponin which is grossly unremarkable. I do not suspect ACS or PE. Patient administered GI cocktail and Zofran with some improvement. Patient has been reassured and instructed to follow-up with her primary care doctor. Has been given instructions on diet modification. Return precautions advised. Patient verbalizes good understanding and agreement with plan. Stable for discharge with prescription for Carafate. Procedures    Critical Care Time:       Diagnosis     Clinical Impression:   1. Gastroesophageal reflux disease, unspecified whether esophagitis present        Disposition: discharged    Follow-up Information       Follow up With Specialties Details Why Contact Info    [facility] EMERGENCY DEPT Emergency Medicine  As needed, If symptoms worsen [address] [facility]    Serafin Steele MD Internal Medicine Physician Call in 1 day  [address] [phone]               Patient's Medications   Start Taking    SUCRALFATE (CARAFATE) 1 GRAM TABLET    Take 1 Tablet by mouth four (4) times daily for 5 days. Continue Taking    APIXABAN (ELIQUIS) 5 MG TABLET    TAKE 1 TABLET BY MOUTH TWO (2) TIMES A DAY. ATORVASTATIN (LIPITOR) 40 MG TABLET    1 tablet daily    BD VEO INSULIN SYRINGE UF 1/2 ML 31 GAUGE X 15/64\" SYRG        BLOOD-GLUCOSE METER (CONTOUR NEXT EZ METER) MONITORING KIT    Test sugar daily    BLOOD-GLUCOSE METER (TRUE METRIX AIR GLUCOSE METER) MONITORING KIT    Test Blood Glucose once daily; ICD 10 E11.9    DULAGLUTIDE (TRULICITY) 1.5 WO/2.3 ML SUB-Q PEN    0.5 mL by SubCUTAneous route every seven (7) days.     GLUCOSE BLOOD VI TEST STRIPS (TRUE METRIX GLUCOSE TEST STRIP) STRIP    Test Blood Glucose once daily; ICD 10 E11.9, Quantity 100    HYDROCHLOROTHIAZIDE (HYDRODIURIL) 12.5 MG TABLET    Take 1 Tablet by mouth daily. INSULIN GLARGINE (LANTUS SOLOSTAR U-100 INSULIN) 100 UNIT/ML (3 ML) INPN    INJECT 50 UNITS UNDER THE SKIN ONCE DAILY    INSULIN NEEDLES, DISPOSABLE, (PEN NEEDLE) 32 GAUGE X 5/32\" NDLE    For insulin shots    LANCETS MISC    Use to check the blood glucose once a day    LOSARTAN (COZAAR) 100 MG TABLET    TAKE 1 TABLET BY MOUTH ONCE DAILY    MELATONIN 5 MG CAP CAPSULE    Take 1 Capsule by mouth nightly. METFORMIN ER (GLUCOPHAGE XR) 750 MG TABLET    TAKE 1 TABLET BY MOUTH TWO TIMES A DAY    OMEPRAZOLE (PRILOSEC) 20 MG CAPSULE    TAKE 1 CAPSULE BY MOUTH DAILY. TRUEPLUS LANCETS 28 GAUGE MISC    USE TO CHECK THE BLOOD GLUCOSE ONCE A DAY   These Medications have changed    No medications on file   Stop Taking    No medications on file     Disclaimer: Sections of this note are dictated using utilizing voice recognition software. Minor typographical errors may be present. If questions arise, please do not hesitate to contact me or call our department. Declines

## 2023-09-21 NOTE — PHYSICAL THERAPY INITIAL EVALUATION ADULT - SITTING BALANCE: DYNAMIC
[facility] Weekly Treatment Plan Review    Date span:  23-23    Patient did not have any absences during this time period.  Date     Group Therapy     x            Individual Therapy      x          Family Therapy                 Psychoeducation                 Other (Specify)                      Total # of Phase 1 Group Sessions:  1                         Total # of Phase 2 Group Sessions:   Total # of Phase 3 Group Sessions:   Total # of 1:1 Sessions: 1   Projected discharge date:   24      Weekly Treatment Plan Review     Treatment Plan initiated on: 23.    Dimension1: Acute Intoxication/Withdrawal Potential -   Previous Dimension Ratin  Current Dimension Ratin  Date of Last Use 23  Any reports of withdrawal symptoms - No      Dimension 2: Biomedical Conditions & Complications -   Previous Dimension Ratin  Current Dimension Ratin  Medical Concerns:  None reported  Current Medications & Medication Changes:  Current Outpatient Medications   Medication    atenolol-chlorthalidone (TENORETIC) 50-25 MG tablet    atorvastatin (LIPITOR) 40 MG tablet    metFORMIN (GLUCOPHAGE-XR) 500 MG 24 hr tablet    nicotine (NICODERM CQ) 14 MG/24HR 24 hr patch    sertraline (ZOLOFT) 25 MG tablet     No current facility-administered medications for this encounter.     Medication Prescriber:  None at this time  Taking meds as prescribed? No, NA  Medication side effects or concerns:  NA  Outside medical appointments this week (list provider and reason for visit):  None      Dimension 3: Emotional/Behavioral Conditions & Complications -   Previous Dimension Ratin  Current Dimension Ratin  PHQ2:       2023    11:00 AM 2023     3:00 PM 3/9/2022     8:02 AM 2021     8:44 AM 7/3/2020     1:59 PM 2019     8:57 AM 2019     4:31 PM   PHQ-2 (  [employer])   Q1: Little interest or pleasure in doing  things 2 3 3 0 0 3 0   Q2: Feeling down, depressed or hopeless 1 3 3 1 0 3 0   PHQ-2 Score 3 6 6 1 0 6 0   PHQ-2 Total Score (12-17 Years)- Positive if 3 or more points; Administer PHQ-A if positive    1 0 6 0      GAD2:       8/1/2023     3:00 PM 9/20/2023    11:00 AM   ROXANN-2   Feeling nervous, anxious, or on edge 3 2   Not being able to stop or control worrying 1 1   ROXANN-2 Total Score 4 3     PROMIS 10-Global Health (all questions and answers displayed):       8/1/2023     3:00 PM 9/20/2023    11:00 AM   PROMIS 10   In general, would you say your health is: 2 3   In general, would you say your quality of life is: 2 4   In general, how would you rate your physical health? 2 3   In general, how would you rate your mental health, including your mood and your ability to think? 1 3   In general, how would you rate your satisfaction with your social activities and relationships? 3 3   In general, please rate how well you carry out your usual social activities and roles. (This includes activities at home, at work and in your community, and responsibilities as a parent, child, spouse, employee, friend, etc.) 3 4   To what extent are you able to carry out your everyday physical activities such as walking, climbing stairs, carrying groceries, or moving a chair? 4 4   In the past 7 days, how often have you been bothered by emotional problems such as feeling anxious, depressed, or irritable? 4 2   In the past 7 days, how would you rate your fatigue on average? 2 2   In the past 7 days, how would you rate your pain on average, where 0 means no pain, and 10 means worst imaginable pain? 0 0   Global Mental Health Score 8 14   Global Physical Health Score 15 16   PROMIS TOTAL - SUBSCORES 23 30     Mental health diagnosis: Major Depressive Disorder, Recurrent Episode, Moderate _ and With anxious distress   Date of last SIB:  None reported  Date of  last SI:  None reported  Date of last HI: None reported  Behavioral Targets:   Decrease difficulties around depressive and grief experiences.   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   Protective factors:  positive relationships positive family connections, forward/future oriented thinking, dedication to family/friends, safe and stable environment, regular sleep, regular physical activity, living with other people, daily obligations, structured day, and committment to well-being  Current MH Assignments:  Box breathing and journaling through difficult experiences.     Narrative:  Current Mental Health symptoms include: isolating / withdrawn, lack of energy, loss of interest / pleasure, low mood, trouble concentrating, irritability, agitation, depressed, hopeless, impaired thinking, impulsivity / disinhibition, anxiety, shaky/jittery, overwhelmed, and helplessness . Active interventions to stabilize mental health symptoms this week : Attend 1:1 and group sessions.      Dimension 4: Treatment Acceptance / Resistance -   Previous Dimension Ratin  Current Dimension Ratin  SARAY Diagnosis:  Alcohol Use Disorder   303.90 (F10.20) Severe In early remission,   Commitment to tx process/Stage of change- 2 Contemplation  SARAY assignments - Track urge and cravings using worksheet.     Narrative - Client reports desire for sobriety and endorse some internal motivation for recovery.       Dimension 5: Relapse / Continued Problem Potential -   Previous Dimension Rating:  3  Current Dimension Rating:  3  Relapses this week - None  Urges to use - YES, List when feeling anxious  UA results - No results found for this or any previous visit (from the past 168 hour(s)).    Narrative- Risk for recurrent use is high. Client experiences ongoing mental health experiences with little support at this time. Client works at a liquor store.     Dimension 6: Recovery Environment -   Previous Dimension Rating:  3  Current Dimension Ratin  Family Involvement - Not at this time  Summarize attendance at  family groups and family sessions - NA  Family supportive of treatment?  Yes    Community support group attendance - None at this time.   Recreational activities - Reading and knitting    Narrative - Client has supportive family. Client works at a liquor store. Client denies any legal involvements.     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 She endorse difficult mental health experiences and little to no knowledge of coping tools. Client has had a minial period of sobriety so far.   Treatment coordination activities this week:   NA  Need for peer recovery support referral? No    Has vulnerable adult status change? N/A    Interdisciplinary Clinical Supervision including: Mental health professional    Are Treatment Plan goals/objectives effective? Yes  *If no, list changes to treatment plan:    Are the current goals meeting client's needs? Yes  *If no, list the changes to treatment plan.    Service Type:  Individual Therapy Session      Session Start Time: 11am  Session End Time: 12n     Session Length: 1    Attendees:  Patient    Service Modality:  In-person     Interactive Complexity: No    Data: Completed service intake update for client to begin programming tomorrow.     Interventions:  facilitated session, asked clarifying questions, reflective listening, safety planning, and validated feelings    Assessment:  Client presented with motivation for programming.     Client response:  Cooperative, motivated.     Plan:  Continue per Master Treatment Plan    *Client agrees with any changes to the treatment plan: Yes  *Client received copy of changes: Yes  *Client is aware of right to access a treatment plan review: Yes    Tammy Wade, MPS, LSW, LADC, LPCC  MI/CD Psychotherapist  [facility] [address], [place] [address]  Phone: [phone] Fax:  [phone]     normal balance